# Patient Record
Sex: FEMALE | Race: WHITE | NOT HISPANIC OR LATINO | URBAN - METROPOLITAN AREA
[De-identification: names, ages, dates, MRNs, and addresses within clinical notes are randomized per-mention and may not be internally consistent; named-entity substitution may affect disease eponyms.]

---

## 2017-02-02 ENCOUNTER — OUTPATIENT (OUTPATIENT)
Dept: OUTPATIENT SERVICES | Facility: HOSPITAL | Age: 62
LOS: 1 days | Discharge: HOME | End: 2017-02-02

## 2017-06-27 DIAGNOSIS — R52 PAIN, UNSPECIFIED: ICD-10-CM

## 2022-12-02 ENCOUNTER — APPOINTMENT (OUTPATIENT)
Age: 67
End: 2022-12-02

## 2022-12-02 VITALS
BODY MASS INDEX: 31.28 KG/M2 | DIASTOLIC BLOOD PRESSURE: 60 MMHG | RESPIRATION RATE: 12 BRPM | SYSTOLIC BLOOD PRESSURE: 120 MMHG | HEART RATE: 85 BPM | HEIGHT: 62 IN | WEIGHT: 170 LBS | OXYGEN SATURATION: 97 %

## 2022-12-02 DIAGNOSIS — Z87.39 PERSONAL HISTORY OF OTHER DISEASES OF THE MUSCULOSKELETAL SYSTEM AND CONNECTIVE TISSUE: ICD-10-CM

## 2022-12-02 DIAGNOSIS — Z86.79 PERSONAL HISTORY OF OTHER DISEASES OF THE CIRCULATORY SYSTEM: ICD-10-CM

## 2022-12-02 PROCEDURE — 99203 OFFICE O/P NEW LOW 30 MIN: CPT

## 2022-12-02 NOTE — ASSESSMENT
[FreeTextEntry1] : Patient has blunting of the costophrenic angles which may be related to tiny pleural effusions.\par She is undergoing a cardiac work-up.\par She states that she has renal disease due to the PMR.  I do not think that she has any overt lung disease.\par The patient has been cautioned to watch her diet especially as it relates to salt consumption.\par We will see what the cardiologist has to say.  She may need intermittent doses of low-dose diuretics.\par She is going to follow-up with nephrology.\par

## 2022-12-02 NOTE — REASON FOR VISIT
[Initial] : an initial visit [TextBox_[id number]] : Patient had a URI about a month ago and was sent for a chest x-ray.  The chest x-ray was normal other than bilateral blunting of the costophrenic angle suggestive of a pleural effusion.  The patient is feeling absolutely fine.  However, she was nervous about the abnormal chest x-ray and presents for evaluation.  She has a history of PMR and hypertension.  She is followed by cardiology who is recently ordered an echo and a stress test.  She states that she occasionally gets swollen ankles over the course of the day that subside when she is sleeping at night.  She has dyspnea on exertion but she believes that this is due to not exercising.  She has never had any lung problems.

## 2023-06-02 ENCOUNTER — LABORATORY RESULT (OUTPATIENT)
Age: 68
End: 2023-06-02

## 2023-06-02 ENCOUNTER — APPOINTMENT (OUTPATIENT)
Dept: HEMATOLOGY ONCOLOGY | Facility: CLINIC | Age: 68
End: 2023-06-02
Payer: MEDICARE

## 2023-06-02 ENCOUNTER — OUTPATIENT (OUTPATIENT)
Dept: OUTPATIENT SERVICES | Facility: HOSPITAL | Age: 68
LOS: 1 days | End: 2023-06-02
Payer: MEDICARE

## 2023-06-02 VITALS
HEIGHT: 62 IN | WEIGHT: 152 LBS | RESPIRATION RATE: 16 BRPM | HEART RATE: 80 BPM | DIASTOLIC BLOOD PRESSURE: 64 MMHG | TEMPERATURE: 98 F | SYSTOLIC BLOOD PRESSURE: 135 MMHG | BODY MASS INDEX: 27.97 KG/M2

## 2023-06-02 DIAGNOSIS — D64.9 ANEMIA, UNSPECIFIED: ICD-10-CM

## 2023-06-02 LAB
HCT VFR BLD CALC: 25.2 %
HGB BLD-MCNC: 8.1 G/DL
MCHC RBC-ENTMCNC: 27.9 PG
MCHC RBC-ENTMCNC: 32.1 G/DL
MCV RBC AUTO: 86.9 FL
PLATELET # BLD AUTO: 225 K/UL
PMV BLD: 9.1 FL
RBC # BLD: 2.9 M/UL
RBC # FLD: 15.1 %
WBC # FLD AUTO: 4.79 K/UL

## 2023-06-02 PROCEDURE — 85046 RETICYTE/HGB CONCENTRATE: CPT

## 2023-06-02 PROCEDURE — 99205 OFFICE O/P NEW HI 60 MIN: CPT

## 2023-06-02 PROCEDURE — 84550 ASSAY OF BLOOD/URIC ACID: CPT

## 2023-06-02 PROCEDURE — 82728 ASSAY OF FERRITIN: CPT

## 2023-06-02 PROCEDURE — 83550 IRON BINDING TEST: CPT

## 2023-06-02 PROCEDURE — 83540 ASSAY OF IRON: CPT

## 2023-06-02 PROCEDURE — 83615 LACTATE (LD) (LDH) ENZYME: CPT

## 2023-06-02 PROCEDURE — 80076 HEPATIC FUNCTION PANEL: CPT

## 2023-06-02 PROCEDURE — 36415 COLL VENOUS BLD VENIPUNCTURE: CPT

## 2023-06-02 PROCEDURE — 80048 BASIC METABOLIC PNL TOTAL CA: CPT

## 2023-06-02 PROCEDURE — 84100 ASSAY OF PHOSPHORUS: CPT

## 2023-06-02 PROCEDURE — 83921 ORGANIC ACID SINGLE QUANT: CPT

## 2023-06-02 PROCEDURE — 85027 COMPLETE CBC AUTOMATED: CPT

## 2023-06-02 NOTE — HISTORY OF PRESENT ILLNESS
[de-identified] : Ms. AMELIA JOHNSON is a 67 year old female here today for evaluation and management of anemia.  \par \par AMELIA is a 67 year old F with PMHx including Polymyalgia rheumatica, pleural effusion, HTN, hyperlipidemia, who presents to clinic to establish care.   She states she has not had anemia prior to this occasion.  She was taking MTX and prednisone for PMR but stopped a few days ago.  She follows with [facility], DR. Claros.  She reports SOB with exertion.  Last GYN exam was in June 2022.  She reports unintentional weight loss over the last year as well as occasional night sweats.  Patient denies CP, palpitations, dizziness, dyspnea at rest, hematuria or overt bleeding.  Patient reports no known family history of malignancy or blood/clotting disorder.  Former smoker, quit in 1998.  \par \par LAB WORKUP\par (5.15.2023 - [facility]) WBC 4.7, Hgb 8.7, MCV 86.7, RDW 13.9, \par (5.10.2023 - [facility]) WBC 4.1, Hgb 8.6, MCV 87.7, RDW 13.9, \par (4.26.2023 - [facility]) WBC 6.9, Hgb 8.5, MCV 86.4, RDW 14, \par (4.22.2023 - [facility]) WBC 5.5, Hgb 8.8, MCV 88, RDW 14, , ironsat 11%, TIBC 228, ferritin 221, Vitamin B12 is 230, Folate >24, Cr 1.35, eGFR 43, normal LFTs\par \par HCM\par Colonoscopy done in 01/2023 with Dr. Meadows which was reportedly normal\par Upper Endoscopy done in 05/2023 with Dr. Meadows which was reportedly normal \par Mammography?\par GYN Pelvic Exam/pap overdue

## 2023-06-02 NOTE — REVIEW OF SYSTEMS
[Diarrhea: Grade 0] : Diarrhea: Grade 0 [Negative] : Allergic/Immunologic [Night Sweats] : night sweats [Fatigue] : fatigue [Recent Change In Weight] : ~T recent weight change

## 2023-06-02 NOTE — ASSESSMENT
[FreeTextEntry1] : # Anemia, normocytic ; vitamin B12 deficiency + iron deficiency \par - s/p Colonoscopy done in 01/2023 with Dr. Meadows which was reportedly normal ; requested patient to obtain results for our review \par - s/p Upper Endoscopy done in 05/2023 with Dr. Meadows which was reportedly normal ; requested patient to obtain results for our review \par - followup with GI as recommended to discuss role of VCE \par - followup with GYN as scheduled this month for complete pelvic examination \par - START IV Venofer 200mg x 5 , orders written + possible side effects discussed\par - s/p Vitamin B12 weekly x 4 , completed in 05/2023 \par - START Vitamin B12 1000mcg by mouth daily, Rx sent\par - START Folic Acid 1mg by mouth daily, Rx sent \par - Labwork today: CBC, BMP, LFTS, LDH, uric acid, phos, retic count, iron studies, ferritin, MMA level \par \par Labwork in 1 week: BMP \par RTC 5 weeks post final venofer with CBC, iron studies, ferritin 1-2 days prior \par \par seen/examined w/ NP Diane; note reviewed; case discussed\par the etiology of iron deficiency , b12 deficiency, fatigue, wt loss, and night sweats is not clear\par denies mucocutaneous bleeding\par not clear why she has renal insufficiency\par 1. increase water intake: BMP weekly: if renal fx improves CT CAP IVC to evaluate for lymphadenopathy/organomegaly; otherwise, CT CAP without IVC\par \par 2. stop oral iron and start venofer 200mg WEEKLY x5\par 3. start b12 and folate oral

## 2023-06-02 NOTE — CONSULT LETTER
[Dear  ___] : Dear  [unfilled], [Consult Letter:] : I had the pleasure of evaluating your patient, [unfilled]. [Please see my note below.] : Please see my note below. [Sincerely,] : Sincerely, [FreeTextEntry3] : Desean Madison DO\par Attending Physician,\par Hematology/ Medical Oncology\par [phone] office\par \par

## 2023-06-03 DIAGNOSIS — D64.9 ANEMIA, UNSPECIFIED: ICD-10-CM

## 2023-06-05 DIAGNOSIS — E53.8 DEFICIENCY OF OTHER SPECIFIED B GROUP VITAMINS: ICD-10-CM

## 2023-06-05 DIAGNOSIS — E61.1 IRON DEFICIENCY: ICD-10-CM

## 2023-06-05 LAB
ALBUMIN SERPL ELPH-MCNC: 3.1 G/DL
ALP BLD-CCNC: 64 U/L
ALT SERPL-CCNC: 10 U/L
ANION GAP SERPL CALC-SCNC: 11 MMOL/L
AST SERPL-CCNC: 20 U/L
BILIRUB DIRECT SERPL-MCNC: <0.2 MG/DL
BILIRUB INDIRECT SERPL-MCNC: >0.3 MG/DL
BILIRUB SERPL-MCNC: 0.5 MG/DL
BUN SERPL-MCNC: 23 MG/DL
CALCIUM SERPL-MCNC: 8.7 MG/DL
CHLORIDE SERPL-SCNC: 106 MMOL/L
CO2 SERPL-SCNC: 21 MMOL/L
CREAT SERPL-MCNC: 1.7 MG/DL
EGFR: 32 ML/MIN/1.73M2
FERRITIN SERPL-MCNC: 366 NG/ML
GLUCOSE SERPL-MCNC: 106 MG/DL
IRON SATN MFR SERPL: 12 %
IRON SERPL-MCNC: 23 UG/DL
LDH SERPL-CCNC: 281
PHOSPHATE SERPL-MCNC: 3.8 MG/DL
POTASSIUM SERPL-SCNC: 3.9 MMOL/L
PROT SERPL-MCNC: 6.6 G/DL
SODIUM SERPL-SCNC: 138 MMOL/L
TIBC SERPL-MCNC: 185 UG/DL
UIBC SERPL-MCNC: 162 UG/DL
URATE SERPL-MCNC: 8 MG/DL

## 2023-06-06 LAB — METHYLMALONATE SERPL-SCNC: 229 NMOL/L

## 2023-06-07 ENCOUNTER — APPOINTMENT (OUTPATIENT)
Dept: INFUSION THERAPY | Facility: CLINIC | Age: 68
End: 2023-06-07

## 2023-06-07 ENCOUNTER — OUTPATIENT (OUTPATIENT)
Dept: OUTPATIENT SERVICES | Facility: HOSPITAL | Age: 68
LOS: 1 days | End: 2023-06-07
Payer: MEDICARE

## 2023-06-07 DIAGNOSIS — E61.1 IRON DEFICIENCY: ICD-10-CM

## 2023-06-07 DIAGNOSIS — D64.9 ANEMIA, UNSPECIFIED: ICD-10-CM

## 2023-06-07 DIAGNOSIS — E53.8 DEFICIENCY OF OTHER SPECIFIED B GROUP VITAMINS: ICD-10-CM

## 2023-06-07 PROCEDURE — 96361 HYDRATE IV INFUSION ADD-ON: CPT

## 2023-06-07 PROCEDURE — 96360 HYDRATION IV INFUSION INIT: CPT

## 2023-06-07 RX ORDER — SODIUM CHLORIDE 9 MG/ML
1000 INJECTION INTRAMUSCULAR; INTRAVENOUS; SUBCUTANEOUS
Refills: 0 | Status: DISCONTINUED | OUTPATIENT
Start: 2023-06-07 | End: 2023-09-07

## 2023-06-08 ENCOUNTER — APPOINTMENT (OUTPATIENT)
Dept: HEMATOLOGY ONCOLOGY | Facility: CLINIC | Age: 68
End: 2023-06-08

## 2023-06-12 ENCOUNTER — APPOINTMENT (OUTPATIENT)
Dept: INFUSION THERAPY | Facility: CLINIC | Age: 68
End: 2023-06-12

## 2023-06-12 ENCOUNTER — OUTPATIENT (OUTPATIENT)
Dept: OUTPATIENT SERVICES | Facility: HOSPITAL | Age: 68
LOS: 1 days | End: 2023-06-12
Payer: MEDICARE

## 2023-06-12 DIAGNOSIS — E61.1 IRON DEFICIENCY: ICD-10-CM

## 2023-06-12 DIAGNOSIS — Z00.00 ENCOUNTER FOR GENERAL ADULT MEDICAL EXAMINATION W/OUT ABNORMAL FINDINGS: ICD-10-CM

## 2023-06-12 DIAGNOSIS — D64.9 ANEMIA, UNSPECIFIED: ICD-10-CM

## 2023-06-12 DIAGNOSIS — E53.8 DEFICIENCY OF OTHER SPECIFIED B GROUP VITAMINS: ICD-10-CM

## 2023-06-12 PROCEDURE — 36415 COLL VENOUS BLD VENIPUNCTURE: CPT

## 2023-06-12 PROCEDURE — 96365 THER/PROPH/DIAG IV INF INIT: CPT

## 2023-06-12 PROCEDURE — 80048 BASIC METABOLIC PNL TOTAL CA: CPT

## 2023-06-12 RX ORDER — IRON SUCROSE 20 MG/ML
200 INJECTION, SOLUTION INTRAVENOUS ONCE
Refills: 0 | Status: COMPLETED | OUTPATIENT
Start: 2023-06-12 | End: 2023-06-12

## 2023-06-12 RX ADMIN — IRON SUCROSE 220 MILLIGRAM(S): 20 INJECTION, SOLUTION INTRAVENOUS at 15:46

## 2023-06-12 RX ADMIN — IRON SUCROSE 200 MILLIGRAM(S): 20 INJECTION, SOLUTION INTRAVENOUS at 16:16

## 2023-06-13 LAB
ANION GAP SERPL CALC-SCNC: 11 MMOL/L
BUN SERPL-MCNC: 18 MG/DL
CALCIUM SERPL-MCNC: 8.3 MG/DL
CHLORIDE SERPL-SCNC: 104 MMOL/L
CO2 SERPL-SCNC: 22 MMOL/L
CREAT SERPL-MCNC: 1.7 MG/DL
EGFR: 32 ML/MIN/1.73M2
GLUCOSE SERPL-MCNC: 101 MG/DL
POTASSIUM SERPL-SCNC: 3.9 MMOL/L
SODIUM SERPL-SCNC: 137 MMOL/L

## 2023-06-19 ENCOUNTER — OUTPATIENT (OUTPATIENT)
Dept: OUTPATIENT SERVICES | Facility: HOSPITAL | Age: 68
LOS: 1 days | End: 2023-06-19
Payer: MEDICARE

## 2023-06-19 ENCOUNTER — APPOINTMENT (OUTPATIENT)
Dept: INFUSION THERAPY | Facility: CLINIC | Age: 68
End: 2023-06-19

## 2023-06-19 DIAGNOSIS — D64.9 ANEMIA, UNSPECIFIED: ICD-10-CM

## 2023-06-19 DIAGNOSIS — E53.8 DEFICIENCY OF OTHER SPECIFIED B GROUP VITAMINS: ICD-10-CM

## 2023-06-19 DIAGNOSIS — E61.1 IRON DEFICIENCY: ICD-10-CM

## 2023-06-19 PROCEDURE — 80048 BASIC METABOLIC PNL TOTAL CA: CPT

## 2023-06-19 PROCEDURE — 96365 THER/PROPH/DIAG IV INF INIT: CPT

## 2023-06-19 PROCEDURE — 36415 COLL VENOUS BLD VENIPUNCTURE: CPT

## 2023-06-19 RX ORDER — IRON SUCROSE 20 MG/ML
200 INJECTION, SOLUTION INTRAVENOUS ONCE
Refills: 0 | Status: COMPLETED | OUTPATIENT
Start: 2023-06-19 | End: 2023-06-19

## 2023-06-19 RX ADMIN — IRON SUCROSE 220 MILLIGRAM(S): 20 INJECTION, SOLUTION INTRAVENOUS at 15:51

## 2023-06-19 RX ADMIN — IRON SUCROSE 200 MILLIGRAM(S): 20 INJECTION, SOLUTION INTRAVENOUS at 16:20

## 2023-06-26 ENCOUNTER — APPOINTMENT (OUTPATIENT)
Dept: INFUSION THERAPY | Facility: CLINIC | Age: 68
End: 2023-06-26

## 2023-06-26 ENCOUNTER — LABORATORY RESULT (OUTPATIENT)
Age: 68
End: 2023-06-26

## 2023-06-26 ENCOUNTER — OUTPATIENT (OUTPATIENT)
Dept: OUTPATIENT SERVICES | Facility: HOSPITAL | Age: 68
LOS: 1 days | End: 2023-06-26
Payer: MEDICARE

## 2023-06-26 DIAGNOSIS — D64.9 ANEMIA, UNSPECIFIED: ICD-10-CM

## 2023-06-26 DIAGNOSIS — E61.1 IRON DEFICIENCY: ICD-10-CM

## 2023-06-26 DIAGNOSIS — E53.8 DEFICIENCY OF OTHER SPECIFIED B GROUP VITAMINS: ICD-10-CM

## 2023-06-26 PROCEDURE — 85027 COMPLETE CBC AUTOMATED: CPT

## 2023-06-26 PROCEDURE — 96365 THER/PROPH/DIAG IV INF INIT: CPT

## 2023-06-26 PROCEDURE — 36415 COLL VENOUS BLD VENIPUNCTURE: CPT

## 2023-06-26 RX ORDER — IRON SUCROSE 20 MG/ML
200 INJECTION, SOLUTION INTRAVENOUS ONCE
Refills: 0 | Status: COMPLETED | OUTPATIENT
Start: 2023-06-26 | End: 2023-06-26

## 2023-06-26 RX ADMIN — IRON SUCROSE 200 MILLIGRAM(S): 20 INJECTION, SOLUTION INTRAVENOUS at 15:50

## 2023-06-26 RX ADMIN — IRON SUCROSE 220 MILLIGRAM(S): 20 INJECTION, SOLUTION INTRAVENOUS at 15:17

## 2023-07-03 ENCOUNTER — APPOINTMENT (OUTPATIENT)
Dept: INFUSION THERAPY | Facility: CLINIC | Age: 68
End: 2023-07-03

## 2023-07-03 ENCOUNTER — OUTPATIENT (OUTPATIENT)
Dept: OUTPATIENT SERVICES | Facility: HOSPITAL | Age: 68
LOS: 1 days | End: 2023-07-03
Payer: MEDICARE

## 2023-07-03 ENCOUNTER — EMERGENCY (EMERGENCY)
Facility: HOSPITAL | Age: 68
LOS: 0 days | Discharge: HOME | End: 2023-07-04
Attending: EMERGENCY MEDICINE
Payer: MEDICARE

## 2023-07-03 ENCOUNTER — LABORATORY RESULT (OUTPATIENT)
Age: 68
End: 2023-07-03

## 2023-07-03 VITALS
SYSTOLIC BLOOD PRESSURE: 182 MMHG | RESPIRATION RATE: 18 BRPM | OXYGEN SATURATION: 100 % | TEMPERATURE: 98 F | DIASTOLIC BLOOD PRESSURE: 80 MMHG | WEIGHT: 149.91 LBS | HEART RATE: 80 BPM

## 2023-07-03 VITALS
RESPIRATION RATE: 17 BRPM | HEART RATE: 84 BPM | OXYGEN SATURATION: 100 % | SYSTOLIC BLOOD PRESSURE: 165 MMHG | TEMPERATURE: 98 F | DIASTOLIC BLOOD PRESSURE: 72 MMHG

## 2023-07-03 DIAGNOSIS — E87.6 HYPOKALEMIA: ICD-10-CM

## 2023-07-03 DIAGNOSIS — I10 ESSENTIAL (PRIMARY) HYPERTENSION: ICD-10-CM

## 2023-07-03 DIAGNOSIS — Z88.5 ALLERGY STATUS TO NARCOTIC AGENT: ICD-10-CM

## 2023-07-03 DIAGNOSIS — R06.02 SHORTNESS OF BREATH: ICD-10-CM

## 2023-07-03 DIAGNOSIS — D64.9 ANEMIA, UNSPECIFIED: ICD-10-CM

## 2023-07-03 DIAGNOSIS — Z87.891 PERSONAL HISTORY OF NICOTINE DEPENDENCE: ICD-10-CM

## 2023-07-03 DIAGNOSIS — E78.5 HYPERLIPIDEMIA, UNSPECIFIED: ICD-10-CM

## 2023-07-03 LAB
ABO RH CONFIRMATION: SIGNIFICANT CHANGE UP
ALBUMIN SERPL ELPH-MCNC: 2.4 G/DL — LOW (ref 3.5–5.2)
ALP SERPL-CCNC: 86 U/L — SIGNIFICANT CHANGE UP (ref 30–115)
ALT FLD-CCNC: 10 U/L — SIGNIFICANT CHANGE UP (ref 0–41)
ANION GAP SERPL CALC-SCNC: 12 MMOL/L — SIGNIFICANT CHANGE UP (ref 7–14)
ANION GAP SERPL CALC-SCNC: 9 MMOL/L
APTT BLD: 32.7 SEC — SIGNIFICANT CHANGE UP (ref 27–39.2)
AST SERPL-CCNC: 17 U/L — SIGNIFICANT CHANGE UP (ref 0–41)
BASOPHILS # BLD AUTO: 0.02 K/UL — SIGNIFICANT CHANGE UP (ref 0–0.2)
BASOPHILS NFR BLD AUTO: 0.3 % — SIGNIFICANT CHANGE UP (ref 0–1)
BILIRUB SERPL-MCNC: 0.4 MG/DL — SIGNIFICANT CHANGE UP (ref 0.2–1.2)
BLD GP AB SCN SERPL QL: SIGNIFICANT CHANGE UP
BUN SERPL-MCNC: 19 MG/DL
BUN SERPL-MCNC: 9 MG/DL — LOW (ref 10–20)
CALCIUM SERPL-MCNC: 7.9 MG/DL — LOW (ref 8.4–10.5)
CALCIUM SERPL-MCNC: 8.2 MG/DL
CHLORIDE SERPL-SCNC: 101 MMOL/L — SIGNIFICANT CHANGE UP (ref 98–110)
CHLORIDE SERPL-SCNC: 104 MMOL/L
CO2 SERPL-SCNC: 21 MMOL/L — SIGNIFICANT CHANGE UP (ref 17–32)
CO2 SERPL-SCNC: 23 MMOL/L
CREAT SERPL-MCNC: 0.9 MG/DL — SIGNIFICANT CHANGE UP (ref 0.7–1.5)
CREAT SERPL-MCNC: 1.5 MG/DL
EGFR: 37 ML/MIN/1.73M2
EGFR: 69 ML/MIN/1.73M2 — SIGNIFICANT CHANGE UP
EOSINOPHIL # BLD AUTO: 0.06 K/UL — SIGNIFICANT CHANGE UP (ref 0–0.7)
EOSINOPHIL NFR BLD AUTO: 0.8 % — SIGNIFICANT CHANGE UP (ref 0–8)
GLUCOSE SERPL-MCNC: 91 MG/DL
GLUCOSE SERPL-MCNC: 93 MG/DL — SIGNIFICANT CHANGE UP (ref 70–99)
HCT VFR BLD CALC: 25.3 %
HCT VFR BLD CALC: 26.4 % — LOW (ref 37–47)
HGB BLD-MCNC: 8 G/DL
HGB BLD-MCNC: 8.4 G/DL — LOW (ref 12–16)
IMM GRANULOCYTES NFR BLD AUTO: 0.6 % — HIGH (ref 0.1–0.3)
INR BLD: 1.31 RATIO — HIGH (ref 0.65–1.3)
LYMPHOCYTES # BLD AUTO: 1.18 K/UL — LOW (ref 1.2–3.4)
LYMPHOCYTES # BLD AUTO: 16.7 % — LOW (ref 20.5–51.1)
MCHC RBC-ENTMCNC: 26.1 PG
MCHC RBC-ENTMCNC: 26.6 PG — LOW (ref 27–31)
MCHC RBC-ENTMCNC: 31.6 G/DL
MCHC RBC-ENTMCNC: 31.8 G/DL — LOW (ref 32–37)
MCV RBC AUTO: 82.7 FL
MCV RBC AUTO: 83.5 FL — SIGNIFICANT CHANGE UP (ref 81–99)
MONOCYTES # BLD AUTO: 0.23 K/UL — SIGNIFICANT CHANGE UP (ref 0.1–0.6)
MONOCYTES NFR BLD AUTO: 3.3 % — SIGNIFICANT CHANGE UP (ref 1.7–9.3)
NEUTROPHILS # BLD AUTO: 5.54 K/UL — SIGNIFICANT CHANGE UP (ref 1.4–6.5)
NEUTROPHILS NFR BLD AUTO: 78.3 % — HIGH (ref 42.2–75.2)
NRBC # BLD: 0 /100 WBCS — SIGNIFICANT CHANGE UP (ref 0–0)
PLATELET # BLD AUTO: 271 K/UL
PLATELET # BLD AUTO: 298 K/UL — SIGNIFICANT CHANGE UP (ref 130–400)
PMV BLD: 9.2 FL
PMV BLD: 9.5 FL — SIGNIFICANT CHANGE UP (ref 7.4–10.4)
POTASSIUM SERPL-MCNC: 3.3 MMOL/L — LOW (ref 3.5–5)
POTASSIUM SERPL-SCNC: 3.3 MMOL/L — LOW (ref 3.5–5)
POTASSIUM SERPL-SCNC: 4 MMOL/L
PROT SERPL-MCNC: 5.9 G/DL — LOW (ref 6–8)
PROTHROM AB SERPL-ACNC: 15.1 SEC — HIGH (ref 9.95–12.87)
RBC # BLD: 3.06 M/UL
RBC # BLD: 3.16 M/UL — LOW (ref 4.2–5.4)
RBC # FLD: 14.5 %
RBC # FLD: 14.6 % — HIGH (ref 11.5–14.5)
SODIUM SERPL-SCNC: 134 MMOL/L — LOW (ref 135–146)
SODIUM SERPL-SCNC: 136 MMOL/L
TROPONIN T SERPL-MCNC: <0.01 NG/ML — SIGNIFICANT CHANGE UP
WBC # BLD: 7.07 K/UL — SIGNIFICANT CHANGE UP (ref 4.8–10.8)
WBC # FLD AUTO: 6.5 K/UL
WBC # FLD AUTO: 7.07 K/UL — SIGNIFICANT CHANGE UP (ref 4.8–10.8)

## 2023-07-03 PROCEDURE — 96365 THER/PROPH/DIAG IV INF INIT: CPT

## 2023-07-03 PROCEDURE — 36430 TRANSFUSION BLD/BLD COMPNT: CPT

## 2023-07-03 PROCEDURE — 99285 EMERGENCY DEPT VISIT HI MDM: CPT | Mod: 25

## 2023-07-03 PROCEDURE — 86850 RBC ANTIBODY SCREEN: CPT

## 2023-07-03 PROCEDURE — 36415 COLL VENOUS BLD VENIPUNCTURE: CPT

## 2023-07-03 PROCEDURE — 85025 COMPLETE CBC W/AUTO DIFF WBC: CPT

## 2023-07-03 PROCEDURE — 85610 PROTHROMBIN TIME: CPT

## 2023-07-03 PROCEDURE — 71045 X-RAY EXAM CHEST 1 VIEW: CPT

## 2023-07-03 PROCEDURE — 85027 COMPLETE CBC AUTOMATED: CPT

## 2023-07-03 PROCEDURE — 86901 BLOOD TYPING SEROLOGIC RH(D): CPT

## 2023-07-03 PROCEDURE — 71045 X-RAY EXAM CHEST 1 VIEW: CPT | Mod: 26

## 2023-07-03 PROCEDURE — 86923 COMPATIBILITY TEST ELECTRIC: CPT

## 2023-07-03 PROCEDURE — 93005 ELECTROCARDIOGRAM TRACING: CPT

## 2023-07-03 PROCEDURE — 84484 ASSAY OF TROPONIN QUANT: CPT

## 2023-07-03 PROCEDURE — P9016: CPT

## 2023-07-03 PROCEDURE — 99285 EMERGENCY DEPT VISIT HI MDM: CPT | Mod: FS

## 2023-07-03 PROCEDURE — 93010 ELECTROCARDIOGRAM REPORT: CPT

## 2023-07-03 PROCEDURE — 85730 THROMBOPLASTIN TIME PARTIAL: CPT

## 2023-07-03 PROCEDURE — 80053 COMPREHEN METABOLIC PANEL: CPT

## 2023-07-03 PROCEDURE — 86900 BLOOD TYPING SEROLOGIC ABO: CPT

## 2023-07-03 RX ORDER — POTASSIUM CHLORIDE 20 MEQ
20 PACKET (EA) ORAL ONCE
Refills: 0 | Status: COMPLETED | OUTPATIENT
Start: 2023-07-03 | End: 2023-07-03

## 2023-07-03 RX ORDER — IRON SUCROSE 20 MG/ML
200 INJECTION, SOLUTION INTRAVENOUS ONCE
Refills: 0 | Status: COMPLETED | OUTPATIENT
Start: 2023-07-03 | End: 2023-07-03

## 2023-07-03 RX ADMIN — IRON SUCROSE 220 MILLIGRAM(S): 20 INJECTION, SOLUTION INTRAVENOUS at 15:30

## 2023-07-03 RX ADMIN — Medication 20 MILLIEQUIVALENT(S): at 20:48

## 2023-07-03 RX ADMIN — IRON SUCROSE 200 MILLIGRAM(S): 20 INJECTION, SOLUTION INTRAVENOUS at 16:01

## 2023-07-03 NOTE — ED PROVIDER NOTE - OBJECTIVE STATEMENT
66 yo female with a pmh of anemia, htn, hld sent in for low hgb. pt was at the heme clinic for a iron infusion and was sent into ED due to her hgb being 7.7. pt states to have experienced SOB with exertion for 2 weeks. pt has received 3 iron transfusion over the past couple of months. pt denies any other symptoms including fevers, chill, headache, recent illness/travel, cough, abdominal pain, chest pain.

## 2023-07-03 NOTE — ED PROVIDER NOTE - PROGRESS NOTE DETAILS
PA spoke to pt. Had pt walk around. No longer SOB while exerting. Transfusion tolerated well. Requesting to go home

## 2023-07-03 NOTE — ED PROVIDER NOTE - CLINICAL SUMMARY MEDICAL DECISION MAKING FREE TEXT BOX
67-year-old female with a past medical history of iron deficiency anemia, extensive work-up negative for bleeding, presents with shortness of breath, generalized fatigue for the past several weeks slowly getting worse.  Had outpatient blood work done recently and hemoglobin 7.7.  States that baseline is around 11 and it was last at that 6 months ago. Endorsed to me by Dr Nino (), to follow up transfusion of pt and reassess. Pt given 1 unit PRBC, and also KCL to replace slightly low serum level. Pt reassessed and feels better, able to walk around and no longer feeling SOB. Requesting to go home. To dc with return precautions. To follow up with her Hematologist in office in next couple of days.

## 2023-07-03 NOTE — ED PROVIDER NOTE - PATIENT PORTAL LINK FT
You can access the FollowMyHealth Patient Portal offered by [facility] by registering at the following website: [email or web address]. By joining [facility]’s FollowMyHealth portal, you will also be able to view your health information using other applications (apps) compatible with our system.

## 2023-07-03 NOTE — ED PROVIDER NOTE - ATTENDING APP SHARED VISIT CONTRIBUTION OF CARE
67-year-old female with a past medical history of iron deficiency anemia, extensive work-up negative for bleeding, presents with shortness of breath, generalized fatigue for the past several weeks slowly getting worse.  Had outpatient blood work done recently and hemoglobin 7.7.  States that baseline is around 11 and it was last at that 6 months ago.  No history of blood transfusion.  Has been getting iron transfusions weekly.  No abdominal pain or chest pain.  On exam nontoxic, vital signs noted, heart regular no murmurs, lungs clear bilaterally, abdomen benign.  Differential diagnosis includes symptomatic anemia from iron deficiency anemia, doubt GI bleed with extensive outpatient work-up, clinically not significantly concerned for ACS.  Chest x-ray not concerning for pneumonia or pulmonary edema.  Plan for blood transfusion, reassess and if feels well anticipate discharge with outpatient follow-up.  EKG with no ST depression or elevation and troponin negative.

## 2023-07-03 NOTE — ED PROVIDER NOTE - EKG ADDITIONAL INFORMATION FREE TEXT
Normal sinus rhythm, normal axis, first-degree AV block, normal intervals otherwise, no ST depression or elevation

## 2023-07-03 NOTE — ED PROVIDER NOTE - CARE PROVIDER_API CALL
Desean Madison  Medical Oncology  [address] [place] [id number]  Phone: [phone]  Fax: [phone]  Follow Up Time: 1-3 Days

## 2023-07-04 DIAGNOSIS — D64.9 ANEMIA, UNSPECIFIED: ICD-10-CM

## 2023-07-04 NOTE — ED ADULT NURSE REASSESSMENT NOTE - NSFALLUNIVINTERV_ED_ALL_ED
Bed/Stretcher in lowest position, wheels locked, appropriate side rails in place/Call bell, personal items and telephone in reach/Instruct patient to call for assistance before getting out of bed/chair/stretcher/Non-slip footwear applied when patient is off stretcher/[place] to call system/Physically safe environment - no spills, clutter or unnecessary equipment/Purposeful proactive rounding/Room/bathroom lighting operational, light cord in reach
Bed/Stretcher in lowest position, wheels locked, appropriate side rails in place/Call bell, personal items and telephone in reach/Instruct patient to call for assistance before getting out of bed/chair/stretcher/Non-slip footwear applied when patient is off stretcher/[place] to call system/Physically safe environment - no spills, clutter or unnecessary equipment/Purposeful proactive rounding/Room/bathroom lighting operational, light cord in reach

## 2023-07-04 NOTE — ED ADULT NURSE REASSESSMENT NOTE - NS ED NURSE REASSESS COMMENT FT1
pt s/p 1U PRBC transfusion. Pt w/o s/s of transfusion rx. Pt verbalized "feeling better". Pt with bedside cardiac monitor in place. VSS.
Pt A&Ox4. Presented to ED for weakness. Pt Hb 8.4 however, pt asymptomatic. Pt with LAC 18G in place. Awaiting lab results of confirmatory type and screen. Transfusion consent in pt chart. Pt able to make needs known. Pt placed on monitor. Call bell and bed alarm in place.

## 2023-07-05 LAB
HCT VFR BLD CALC: 23.9 %
HGB BLD-MCNC: 7.7 G/DL
MCHC RBC-ENTMCNC: 26.6 PG
MCHC RBC-ENTMCNC: 32.2 G/DL
MCV RBC AUTO: 82.4 FL
PLATELET # BLD AUTO: 281 K/UL
PMV BLD: NORMAL
RBC # BLD: 2.9 M/UL
RBC # FLD: 14.6 %
WBC # FLD AUTO: 7.34 K/UL

## 2023-07-10 ENCOUNTER — APPOINTMENT (OUTPATIENT)
Dept: INFUSION THERAPY | Facility: CLINIC | Age: 68
End: 2023-07-10

## 2023-07-10 ENCOUNTER — OUTPATIENT (OUTPATIENT)
Dept: OUTPATIENT SERVICES | Facility: HOSPITAL | Age: 68
LOS: 1 days | End: 2023-07-10
Payer: MEDICARE

## 2023-07-10 DIAGNOSIS — D64.9 ANEMIA, UNSPECIFIED: ICD-10-CM

## 2023-07-10 PROCEDURE — 96365 THER/PROPH/DIAG IV INF INIT: CPT

## 2023-07-10 RX ORDER — IRON SUCROSE 20 MG/ML
200 INJECTION, SOLUTION INTRAVENOUS ONCE
Refills: 0 | Status: COMPLETED | OUTPATIENT
Start: 2023-07-10 | End: 2023-07-10

## 2023-07-10 RX ADMIN — IRON SUCROSE 220 MILLIGRAM(S): 20 INJECTION, SOLUTION INTRAVENOUS at 16:10

## 2023-07-10 RX ADMIN — IRON SUCROSE 200 MILLIGRAM(S): 20 INJECTION, SOLUTION INTRAVENOUS at 16:40

## 2023-07-18 ENCOUNTER — APPOINTMENT (OUTPATIENT)
Dept: HEMATOLOGY ONCOLOGY | Facility: CLINIC | Age: 68
End: 2023-07-18
Payer: MEDICARE

## 2023-07-18 ENCOUNTER — LABORATORY RESULT (OUTPATIENT)
Age: 68
End: 2023-07-18

## 2023-07-18 ENCOUNTER — OUTPATIENT (OUTPATIENT)
Dept: OUTPATIENT SERVICES | Facility: HOSPITAL | Age: 68
LOS: 1 days | End: 2023-07-18
Payer: MEDICARE

## 2023-07-18 VITALS
DIASTOLIC BLOOD PRESSURE: 81 MMHG | TEMPERATURE: 98 F | HEIGHT: 62 IN | WEIGHT: 139 LBS | SYSTOLIC BLOOD PRESSURE: 196 MMHG | HEART RATE: 76 BPM | BODY MASS INDEX: 25.58 KG/M2 | RESPIRATION RATE: 16 BRPM

## 2023-07-18 DIAGNOSIS — D61.818 OTHER PANCYTOPENIA: ICD-10-CM

## 2023-07-18 LAB
HCT VFR BLD CALC: 31.4 %
HGB BLD-MCNC: 10.1 G/DL
MCHC RBC-ENTMCNC: 26.2 PG
MCHC RBC-ENTMCNC: 32.2 G/DL
MCV RBC AUTO: 81.6 FL
PLATELET # BLD AUTO: 222 K/UL
PMV BLD: 10.4 FL
RBC # BLD: 3.85 M/UL
RBC # FLD: 15.3 %
WBC # FLD AUTO: 8.66 K/UL

## 2023-07-18 PROCEDURE — 99214 OFFICE O/P EST MOD 30 MIN: CPT

## 2023-07-18 PROCEDURE — 80048 BASIC METABOLIC PNL TOTAL CA: CPT

## 2023-07-18 PROCEDURE — 80076 HEPATIC FUNCTION PANEL: CPT

## 2023-07-18 PROCEDURE — 84443 ASSAY THYROID STIM HORMONE: CPT

## 2023-07-18 PROCEDURE — 84439 ASSAY OF FREE THYROXINE: CPT

## 2023-07-18 PROCEDURE — 81001 URINALYSIS AUTO W/SCOPE: CPT

## 2023-07-18 PROCEDURE — 84480 ASSAY TRIIODOTHYRONINE (T3): CPT

## 2023-07-18 PROCEDURE — 85027 COMPLETE CBC AUTOMATED: CPT

## 2023-07-18 NOTE — REVIEW OF SYSTEMS
[Night Sweats] : night sweats [Fatigue] : fatigue [Recent Change In Weight] : ~T recent weight change [Diarrhea: Grade 0] : Diarrhea: Grade 0 [Negative] : Allergic/Immunologic [FreeTextEntry2] : 30lb weight loss in 6 months, poor appetite

## 2023-07-18 NOTE — ASSESSMENT
[FreeTextEntry1] : # Anemia, normocytic ; vitamin B12 deficiency + iron deficiency \par - s/p Colonoscopy done in 01/2023 with Dr. Meadows which was reportedly normal ; requested patient to obtain results for our review \par - s/p Upper Endoscopy done in 05/2023 with Dr. Meadows which was reportedly normal ; requested patient to obtain results for our review \par - followup with GI as recommended to discuss role of VCE \par - followup with GYN as scheduled this month for complete pelvic examination \par - c/w Vitamin B12 1000mcg by mouth daily \par - c/w Folic Acid 1mg by mouth daily \par - s/p IV Venofer 200mg x 5 , completed in 07/2023 \par - Labwork today: CBC, BMP, LFTs, TSH, FT4, T3, UA \par - CT C/A/P with IVC ordered to evaluate adenopathy or liver/spleen abnormality, Rx given \par \par Requested to obtain copy of CT without IVC performed at [facility] during ER visit.  Explained this is unlikely to be useful since lack of contrast may obscure lymph findings.   \par \par RTC 1 month with CBC, iron studies, ferritin 2 days prior \par \par seen/ examined w/NP C.Smart; note reviewed; case dscussed; agree w/ above\par 45 lbs wt loss, unintentional, poor appetite, unexplained iron deficiency: aware of CT scan without IVC at [facility]; will get results; still recommend the study with IVC to r/o adenopathy/ lymphoma/ organomegaly

## 2023-07-18 NOTE — HISTORY OF PRESENT ILLNESS
[de-identified] : Ms. AMELIA JOHNSON is a 67 year old female here today for evaluation and management of anemia.  \par \par AMELIA is a 67 year old F with PMHx including Polymyalgia rheumatica, pleural effusion, HTN, hyperlipidemia, who presents to clinic to establish care.   She states she has not had anemia prior to this occasion.  She was taking MTX and prednisone for PMR but stopped a few days ago.  She follows with [facility], DR. Claros.  She reports SOB with exertion.  Last GYN exam was in June 2022.  She reports unintentional weight loss over the last year as well as occasional night sweats.  Patient denies CP, palpitations, dizziness, dyspnea at rest, hematuria or overt bleeding.  Patient reports no known family history of malignancy or blood/clotting disorder.  Former smoker, quit in 1998.  \par \par LAB WORKUP\par (5.15.2023 - [facility]) WBC 4.7, Hgb 8.7, MCV 86.7, RDW 13.9, \par (5.10.2023 - [facility]) WBC 4.1, Hgb 8.6, MCV 87.7, RDW 13.9, \par (4.26.2023 - [facility]) WBC 6.9, Hgb 8.5, MCV 86.4, RDW 14, \par (4.22.2023 - [facility]) WBC 5.5, Hgb 8.8, MCV 88, RDW 14, , ironsat 11%, TIBC 228, ferritin 221, Vitamin B12 is 230, Folate >24, Cr 1.35, eGFR 43, normal LFTs\par \par HCM\par Colonoscopy done in 01/2023 with Dr. Meadows which was reportedly normal\par Upper Endoscopy done in 05/2023 with Dr. Meadows which was reportedly normal \par Mammography?\par GYN Pelvic Exam/pap overdue  [de-identified] : 7/18/23\par Patient is here for a follow-up visit for anemia.  She is feeling well but endorses fatigue.  She completed IV venofer in 07/2023.  Last CBC showed hgb down to 7.7g/dL so she was sent to ER and received 1 unit PRBC.  Patient states she developed RUQ discomfort last week and went to [facility] where they performed CT imaging and further workup including urine testing which showed blood.  She is now on oral abx (Cefuroxime) for suspected UTI.  Patient continues to take oral Vitamin B12 and folic acid once daily.  Reviewed most recent CBC, which shows hgb up to 10.1g/dL.  Patient denies CP, palpitations, dizziness, dyspnea, hematuria or PRBRB.

## 2023-07-18 NOTE — CONSULT LETTER
[Dear  ___] : Dear  [unfilled], [Consult Letter:] : I had the pleasure of evaluating your patient, [unfilled]. [Please see my note below.] : Please see my note below. [Sincerely,] : Sincerely, [FreeTextEntry3] : Desean Madison DO\par Attending Physician,\par Hematology/ Medical Oncology\par [phone] office\par \par

## 2023-07-19 ENCOUNTER — TRANSCRIPTION ENCOUNTER (OUTPATIENT)
Age: 68
End: 2023-07-19

## 2023-07-19 DIAGNOSIS — D61.818 OTHER PANCYTOPENIA: ICD-10-CM

## 2023-07-19 DIAGNOSIS — E53.8 DEFICIENCY OF OTHER SPECIFIED B GROUP VITAMINS: ICD-10-CM

## 2023-07-19 DIAGNOSIS — R63.4 ABNORMAL WEIGHT LOSS: ICD-10-CM

## 2023-07-19 DIAGNOSIS — D64.9 ANEMIA, UNSPECIFIED: ICD-10-CM

## 2023-07-19 DIAGNOSIS — E61.1 IRON DEFICIENCY: ICD-10-CM

## 2023-07-19 LAB
ALBUMIN SERPL ELPH-MCNC: 2.8 G/DL
ALP BLD-CCNC: 96 U/L
ALT SERPL-CCNC: 13 U/L
ANION GAP SERPL CALC-SCNC: 8 MMOL/L
APPEARANCE: CLEAR
AST SERPL-CCNC: 21 U/L
BILIRUB DIRECT SERPL-MCNC: 0.2 MG/DL
BILIRUB INDIRECT SERPL-MCNC: 0.2 MG/DL
BILIRUB SERPL-MCNC: 0.4 MG/DL
BILIRUBIN URINE: NEGATIVE
BLOOD URINE: ABNORMAL
BUN SERPL-MCNC: 7 MG/DL
CALCIUM SERPL-MCNC: 8.8 MG/DL
CHLORIDE SERPL-SCNC: 100 MMOL/L
CO2 SERPL-SCNC: 26 MMOL/L
COLOR: YELLOW
CREAT SERPL-MCNC: 0.7 MG/DL
EGFR: 94 ML/MIN/1.73M2
GLUCOSE QUALITATIVE U: NEGATIVE
GLUCOSE SERPL-MCNC: 96 MG/DL
KETONES URINE: NORMAL
LEUKOCYTE ESTERASE URINE: ABNORMAL
NITRITE URINE: NEGATIVE
PH URINE: 6.5
POTASSIUM SERPL-SCNC: 3.6 MMOL/L
PROT SERPL-MCNC: 6.4 G/DL
PROTEIN URINE: ABNORMAL
SODIUM SERPL-SCNC: 134 MMOL/L
SPECIFIC GRAVITY URINE: 1.02
T3 SERPL-MCNC: 91 NG/DL
T4 FREE SERPL-MCNC: 1.3 NG/DL
TSH SERPL-ACNC: 2.91 UIU/ML
UROBILINOGEN URINE: NORMAL

## 2023-07-24 ENCOUNTER — INPATIENT (INPATIENT)
Facility: HOSPITAL | Age: 68
LOS: 6 days | Discharge: HOME | DRG: 545 | End: 2023-07-31
Attending: HOSPITALIST | Admitting: STUDENT IN AN ORGANIZED HEALTH CARE EDUCATION/TRAINING PROGRAM
Payer: MEDICARE

## 2023-07-24 VITALS
TEMPERATURE: 98 F | HEART RATE: 85 BPM | OXYGEN SATURATION: 97 % | DIASTOLIC BLOOD PRESSURE: 91 MMHG | RESPIRATION RATE: 18 BRPM | SYSTOLIC BLOOD PRESSURE: 180 MMHG | WEIGHT: 138.01 LBS

## 2023-07-24 DIAGNOSIS — J90 PLEURAL EFFUSION, NOT ELSEWHERE CLASSIFIED: ICD-10-CM

## 2023-07-24 LAB
ALBUMIN SERPL ELPH-MCNC: 2.6 G/DL — LOW (ref 3.5–5.2)
ALP SERPL-CCNC: 94 U/L — SIGNIFICANT CHANGE UP (ref 30–115)
ALT FLD-CCNC: 8 U/L — SIGNIFICANT CHANGE UP (ref 0–41)
ANION GAP SERPL CALC-SCNC: 11 MMOL/L — SIGNIFICANT CHANGE UP (ref 7–14)
AST SERPL-CCNC: 16 U/L — SIGNIFICANT CHANGE UP (ref 0–41)
BASOPHILS # BLD AUTO: 0.01 K/UL — SIGNIFICANT CHANGE UP (ref 0–0.2)
BASOPHILS NFR BLD AUTO: 0.2 % — SIGNIFICANT CHANGE UP (ref 0–1)
BILIRUB SERPL-MCNC: 0.7 MG/DL — SIGNIFICANT CHANGE UP (ref 0.2–1.2)
BLD GP AB SCN SERPL QL: SIGNIFICANT CHANGE UP
BUN SERPL-MCNC: 8 MG/DL — LOW (ref 10–20)
CALCIUM SERPL-MCNC: 8.2 MG/DL — LOW (ref 8.4–10.5)
CHLORIDE SERPL-SCNC: 101 MMOL/L — SIGNIFICANT CHANGE UP (ref 98–110)
CO2 SERPL-SCNC: 25 MMOL/L — SIGNIFICANT CHANGE UP (ref 17–32)
CREAT SERPL-MCNC: 0.7 MG/DL — SIGNIFICANT CHANGE UP (ref 0.7–1.5)
D DIMER BLD IA.RAPID-MCNC: 5493 NG/ML DDU — HIGH
EGFR: 94 ML/MIN/1.73M2 — SIGNIFICANT CHANGE UP
EOSINOPHIL # BLD AUTO: 0.13 K/UL — SIGNIFICANT CHANGE UP (ref 0–0.7)
EOSINOPHIL NFR BLD AUTO: 2.8 % — SIGNIFICANT CHANGE UP (ref 0–8)
GLUCOSE SERPL-MCNC: 90 MG/DL — SIGNIFICANT CHANGE UP (ref 70–99)
HCT VFR BLD CALC: 31.5 % — LOW (ref 37–47)
HGB BLD-MCNC: 10 G/DL — LOW (ref 12–16)
IMM GRANULOCYTES NFR BLD AUTO: 0.4 % — HIGH (ref 0.1–0.3)
LIDOCAIN IGE QN: 10 U/L — SIGNIFICANT CHANGE UP (ref 7–60)
LYMPHOCYTES # BLD AUTO: 0.77 K/UL — LOW (ref 1.2–3.4)
LYMPHOCYTES # BLD AUTO: 16.7 % — LOW (ref 20.5–51.1)
MAGNESIUM SERPL-MCNC: 1.7 MG/DL — LOW (ref 1.8–2.4)
MCHC RBC-ENTMCNC: 26.2 PG — LOW (ref 27–31)
MCHC RBC-ENTMCNC: 31.7 G/DL — LOW (ref 32–37)
MCV RBC AUTO: 82.7 FL — SIGNIFICANT CHANGE UP (ref 81–99)
MONOCYTES # BLD AUTO: 0.17 K/UL — SIGNIFICANT CHANGE UP (ref 0.1–0.6)
MONOCYTES NFR BLD AUTO: 3.7 % — SIGNIFICANT CHANGE UP (ref 1.7–9.3)
NEUTROPHILS # BLD AUTO: 3.52 K/UL — SIGNIFICANT CHANGE UP (ref 1.4–6.5)
NEUTROPHILS NFR BLD AUTO: 76.2 % — HIGH (ref 42.2–75.2)
NRBC # BLD: 0 /100 WBCS — SIGNIFICANT CHANGE UP (ref 0–0)
NT-PROBNP SERPL-SCNC: 1328 PG/ML — HIGH (ref 0–300)
PLATELET # BLD AUTO: 299 K/UL — SIGNIFICANT CHANGE UP (ref 130–400)
PMV BLD: 9.4 FL — SIGNIFICANT CHANGE UP (ref 7.4–10.4)
POTASSIUM SERPL-MCNC: 3.6 MMOL/L — SIGNIFICANT CHANGE UP (ref 3.5–5)
POTASSIUM SERPL-SCNC: 3.6 MMOL/L — SIGNIFICANT CHANGE UP (ref 3.5–5)
PROT SERPL-MCNC: 5.8 G/DL — LOW (ref 6–8)
RBC # BLD: 3.81 M/UL — LOW (ref 4.2–5.4)
RBC # FLD: 16 % — HIGH (ref 11.5–14.5)
SODIUM SERPL-SCNC: 137 MMOL/L — SIGNIFICANT CHANGE UP (ref 135–146)
TROPONIN T SERPL-MCNC: <0.01 NG/ML — SIGNIFICANT CHANGE UP
WBC # BLD: 4.62 K/UL — LOW (ref 4.8–10.8)
WBC # FLD AUTO: 4.62 K/UL — LOW (ref 4.8–10.8)

## 2023-07-24 PROCEDURE — 86038 ANTINUCLEAR ANTIBODIES: CPT

## 2023-07-24 PROCEDURE — 71275 CT ANGIOGRAPHY CHEST: CPT | Mod: 26,ME

## 2023-07-24 PROCEDURE — 36415 COLL VENOUS BLD VENIPUNCTURE: CPT

## 2023-07-24 PROCEDURE — 99285 EMERGENCY DEPT VISIT HI MDM: CPT

## 2023-07-24 PROCEDURE — 71046 X-RAY EXAM CHEST 2 VIEWS: CPT | Mod: 26

## 2023-07-24 PROCEDURE — 87389 HIV-1 AG W/HIV-1&-2 AB AG IA: CPT

## 2023-07-24 PROCEDURE — 83550 IRON BINDING TEST: CPT

## 2023-07-24 PROCEDURE — 80048 BASIC METABOLIC PNL TOTAL CA: CPT

## 2023-07-24 PROCEDURE — 86225 DNA ANTIBODY NATIVE: CPT

## 2023-07-24 PROCEDURE — 93010 ELECTROCARDIOGRAM REPORT: CPT

## 2023-07-24 PROCEDURE — 86147 CARDIOLIPIN ANTIBODY EA IG: CPT

## 2023-07-24 PROCEDURE — 85610 PROTHROMBIN TIME: CPT

## 2023-07-24 PROCEDURE — 93005 ELECTROCARDIOGRAM TRACING: CPT

## 2023-07-24 PROCEDURE — 84145 PROCALCITONIN (PCT): CPT

## 2023-07-24 PROCEDURE — 86255 FLUORESCENT ANTIBODY SCREEN: CPT

## 2023-07-24 PROCEDURE — 84480 ASSAY TRIIODOTHYRONINE (T3): CPT

## 2023-07-24 PROCEDURE — 82728 ASSAY OF FERRITIN: CPT

## 2023-07-24 PROCEDURE — 71045 X-RAY EXAM CHEST 1 VIEW: CPT

## 2023-07-24 PROCEDURE — 93308 TTE F-UP OR LMTD: CPT

## 2023-07-24 PROCEDURE — 86618 LYME DISEASE ANTIBODY: CPT

## 2023-07-24 PROCEDURE — G1004: CPT

## 2023-07-24 PROCEDURE — 86480 TB TEST CELL IMMUN MEASURE: CPT

## 2023-07-24 PROCEDURE — 84443 ASSAY THYROID STIM HORMONE: CPT

## 2023-07-24 PROCEDURE — 95819 EEG AWAKE AND ASLEEP: CPT

## 2023-07-24 PROCEDURE — 86200 CCP ANTIBODY: CPT

## 2023-07-24 PROCEDURE — 70450 CT HEAD/BRAIN W/O DYE: CPT

## 2023-07-24 PROCEDURE — 85613 RUSSELL VIPER VENOM DILUTED: CPT

## 2023-07-24 PROCEDURE — 86235 NUCLEAR ANTIGEN ANTIBODY: CPT

## 2023-07-24 PROCEDURE — 84436 ASSAY OF TOTAL THYROXINE: CPT

## 2023-07-24 PROCEDURE — 82607 VITAMIN B-12: CPT

## 2023-07-24 PROCEDURE — 80053 COMPREHEN METABOLIC PANEL: CPT

## 2023-07-24 PROCEDURE — 82550 ASSAY OF CK (CPK): CPT

## 2023-07-24 PROCEDURE — 86431 RHEUMATOID FACTOR QUANT: CPT

## 2023-07-24 PROCEDURE — 85045 AUTOMATED RETICULOCYTE COUNT: CPT

## 2023-07-24 PROCEDURE — 85730 THROMBOPLASTIN TIME PARTIAL: CPT

## 2023-07-24 PROCEDURE — 83516 IMMUNOASSAY NONANTIBODY: CPT

## 2023-07-24 PROCEDURE — 74177 CT ABD & PELVIS W/CONTRAST: CPT

## 2023-07-24 PROCEDURE — 86146 BETA-2 GLYCOPROTEIN ANTIBODY: CPT

## 2023-07-24 PROCEDURE — 82962 GLUCOSE BLOOD TEST: CPT

## 2023-07-24 PROCEDURE — 86803 HEPATITIS C AB TEST: CPT

## 2023-07-24 PROCEDURE — 83540 ASSAY OF IRON: CPT

## 2023-07-24 PROCEDURE — 82746 ASSAY OF FOLIC ACID SERUM: CPT

## 2023-07-24 PROCEDURE — 85025 COMPLETE CBC W/AUTO DIFF WBC: CPT

## 2023-07-24 PROCEDURE — 86140 C-REACTIVE PROTEIN: CPT

## 2023-07-24 PROCEDURE — 93970 EXTREMITY STUDY: CPT

## 2023-07-24 PROCEDURE — 83735 ASSAY OF MAGNESIUM: CPT

## 2023-07-24 PROCEDURE — 84484 ASSAY OF TROPONIN QUANT: CPT

## 2023-07-24 PROCEDURE — 93306 TTE W/DOPPLER COMPLETE: CPT

## 2023-07-24 PROCEDURE — 85652 RBC SED RATE AUTOMATED: CPT

## 2023-07-24 PROCEDURE — 83615 LACTATE (LD) (LDH) ENZYME: CPT

## 2023-07-24 RX ORDER — FUROSEMIDE 40 MG
20 TABLET ORAL ONCE
Refills: 0 | Status: COMPLETED | OUTPATIENT
Start: 2023-07-24 | End: 2023-07-24

## 2023-07-24 RX ADMIN — Medication 20 MILLIGRAM(S): at 23:30

## 2023-07-24 NOTE — ED PROVIDER NOTE - OBJECTIVE STATEMENT
Patient with prior history of hypertension and anemia likely secondary to iron deficiency with negative colonoscopy and endoscopy who presents with shortness of breath since July 3 that is exertional associated with a pleuritic right sided rib pain.  No exertional chest pain.  Was seen at an outside ED found to have Pleural effusion.  Has follow-up with pulmonology.  No etiology noted.  Denies any leg swelling.

## 2023-07-24 NOTE — ED PROVIDER NOTE - EKG ADDITIONAL INFORMATION FREE TEXT
Heart rate 87 MI interval 180 QRS 84 QTc 490 sinus rhythm isolated T wave inversion V1 and V2 no ST elevation PACs

## 2023-07-24 NOTE — ED PROVIDER NOTE - CLINICAL SUMMARY MEDICAL DECISION MAKING FREE TEXT BOX
Patient evaluated for shortness of breath D-dimer was positive BNP was elevated CT angio showed bilateral pleural effusions.  Patient is symptomatic.  Given low-dose of furosemide.  Patient to be admitted for further evaluation

## 2023-07-25 DIAGNOSIS — Z90.49 ACQUIRED ABSENCE OF OTHER SPECIFIED PARTS OF DIGESTIVE TRACT: Chronic | ICD-10-CM

## 2023-07-25 LAB
ALBUMIN SERPL ELPH-MCNC: 2.6 G/DL — LOW (ref 3.5–5.2)
ALP SERPL-CCNC: 96 U/L — SIGNIFICANT CHANGE UP (ref 30–115)
ALT FLD-CCNC: 10 U/L — SIGNIFICANT CHANGE UP (ref 0–41)
ANION GAP SERPL CALC-SCNC: 14 MMOL/L — SIGNIFICANT CHANGE UP (ref 7–14)
APTT BLD: 32.5 SEC — SIGNIFICANT CHANGE UP (ref 27–39.2)
AST SERPL-CCNC: 23 U/L — SIGNIFICANT CHANGE UP (ref 0–41)
BASOPHILS # BLD AUTO: 0.02 K/UL — SIGNIFICANT CHANGE UP (ref 0–0.2)
BASOPHILS NFR BLD AUTO: 0.4 % — SIGNIFICANT CHANGE UP (ref 0–1)
BILIRUB SERPL-MCNC: 0.5 MG/DL — SIGNIFICANT CHANGE UP (ref 0.2–1.2)
BUN SERPL-MCNC: 7 MG/DL — LOW (ref 10–20)
CALCIUM SERPL-MCNC: 8.4 MG/DL — SIGNIFICANT CHANGE UP (ref 8.4–10.4)
CHLORIDE SERPL-SCNC: 100 MMOL/L — SIGNIFICANT CHANGE UP (ref 98–110)
CK SERPL-CCNC: 35 U/L — SIGNIFICANT CHANGE UP (ref 0–225)
CO2 SERPL-SCNC: 23 MMOL/L — SIGNIFICANT CHANGE UP (ref 17–32)
CREAT SERPL-MCNC: 0.8 MG/DL — SIGNIFICANT CHANGE UP (ref 0.7–1.5)
EGFR: 80 ML/MIN/1.73M2 — SIGNIFICANT CHANGE UP
EOSINOPHIL # BLD AUTO: 0.16 K/UL — SIGNIFICANT CHANGE UP (ref 0–0.7)
EOSINOPHIL NFR BLD AUTO: 3.4 % — SIGNIFICANT CHANGE UP (ref 0–8)
FERRITIN SERPL-MCNC: 1527 NG/ML — HIGH (ref 13–330)
GLUCOSE SERPL-MCNC: 86 MG/DL — SIGNIFICANT CHANGE UP (ref 70–99)
HCT VFR BLD CALC: 32.7 % — LOW (ref 37–47)
HCV AB S/CO SERPL IA: 0.04 COI — SIGNIFICANT CHANGE UP
HCV AB SERPL-IMP: SIGNIFICANT CHANGE UP
HGB BLD-MCNC: 10.5 G/DL — LOW (ref 12–16)
HIV 1+2 AB+HIV1 P24 AG SERPL QL IA: SIGNIFICANT CHANGE UP
IMM GRANULOCYTES NFR BLD AUTO: 0.2 % — SIGNIFICANT CHANGE UP (ref 0.1–0.3)
INR BLD: 1.14 RATIO — SIGNIFICANT CHANGE UP (ref 0.65–1.3)
LDH SERPL L TO P-CCNC: 506 — HIGH (ref 50–242)
LYMPHOCYTES # BLD AUTO: 0.9 K/UL — LOW (ref 1.2–3.4)
LYMPHOCYTES # BLD AUTO: 18.9 % — LOW (ref 20.5–51.1)
MAGNESIUM SERPL-MCNC: 1.6 MG/DL — LOW (ref 1.8–2.4)
MCHC RBC-ENTMCNC: 26.8 PG — LOW (ref 27–31)
MCHC RBC-ENTMCNC: 32.1 G/DL — SIGNIFICANT CHANGE UP (ref 32–37)
MCV RBC AUTO: 83.4 FL — SIGNIFICANT CHANGE UP (ref 81–99)
MONOCYTES # BLD AUTO: 0.2 K/UL — SIGNIFICANT CHANGE UP (ref 0.1–0.6)
MONOCYTES NFR BLD AUTO: 4.2 % — SIGNIFICANT CHANGE UP (ref 1.7–9.3)
NEUTROPHILS # BLD AUTO: 3.47 K/UL — SIGNIFICANT CHANGE UP (ref 1.4–6.5)
NEUTROPHILS NFR BLD AUTO: 72.9 % — SIGNIFICANT CHANGE UP (ref 42.2–75.2)
NRBC # BLD: 0 /100 WBCS — SIGNIFICANT CHANGE UP (ref 0–0)
PLATELET # BLD AUTO: 315 K/UL — SIGNIFICANT CHANGE UP (ref 130–400)
PMV BLD: 9.3 FL — SIGNIFICANT CHANGE UP (ref 7.4–10.4)
POTASSIUM SERPL-MCNC: 4 MMOL/L — SIGNIFICANT CHANGE UP (ref 3.5–5)
POTASSIUM SERPL-SCNC: 4 MMOL/L — SIGNIFICANT CHANGE UP (ref 3.5–5)
PROT SERPL-MCNC: 6.5 G/DL — SIGNIFICANT CHANGE UP (ref 6–8)
PROTHROM AB SERPL-ACNC: 13 SEC — HIGH (ref 9.95–12.87)
RBC # BLD: 3.92 M/UL — LOW (ref 4.2–5.4)
RBC # BLD: 3.92 M/UL — LOW (ref 4.2–5.4)
RBC # FLD: 16 % — HIGH (ref 11.5–14.5)
RETICS #: 55.3 K/UL — SIGNIFICANT CHANGE UP (ref 25–125)
RETICS/RBC NFR: 1.4 % — SIGNIFICANT CHANGE UP (ref 0.5–1.5)
RHEUMATOID FACT SERPL-ACNC: <10 IU/ML — SIGNIFICANT CHANGE UP (ref 0–13)
SODIUM SERPL-SCNC: 137 MMOL/L — SIGNIFICANT CHANGE UP (ref 135–146)
TROPONIN T SERPL-MCNC: <0.01 NG/ML — SIGNIFICANT CHANGE UP
TSH SERPL-MCNC: 5.72 UIU/ML — HIGH (ref 0.27–4.2)
WBC # BLD: 4.76 K/UL — LOW (ref 4.8–10.8)
WBC # FLD AUTO: 4.76 K/UL — LOW (ref 4.8–10.8)

## 2023-07-25 PROCEDURE — 74177 CT ABD & PELVIS W/CONTRAST: CPT | Mod: 26

## 2023-07-25 PROCEDURE — 99223 1ST HOSP IP/OBS HIGH 75: CPT

## 2023-07-25 PROCEDURE — 93010 ELECTROCARDIOGRAM REPORT: CPT

## 2023-07-25 PROCEDURE — 93306 TTE W/DOPPLER COMPLETE: CPT | Mod: 26

## 2023-07-25 RX ORDER — HYDRALAZINE HCL 50 MG
50 TABLET ORAL
Refills: 0 | Status: DISCONTINUED | OUTPATIENT
Start: 2023-07-25 | End: 2023-07-26

## 2023-07-25 RX ORDER — PANTOPRAZOLE SODIUM 20 MG/1
40 TABLET, DELAYED RELEASE ORAL
Refills: 0 | Status: DISCONTINUED | OUTPATIENT
Start: 2023-07-25 | End: 2023-07-31

## 2023-07-25 RX ORDER — FOLIC ACID 0.8 MG
1 TABLET ORAL
Refills: 0 | DISCHARGE

## 2023-07-25 RX ORDER — ENOXAPARIN SODIUM 100 MG/ML
40 INJECTION SUBCUTANEOUS EVERY 24 HOURS
Refills: 0 | Status: DISCONTINUED | OUTPATIENT
Start: 2023-07-25 | End: 2023-07-31

## 2023-07-25 RX ORDER — PANTOPRAZOLE SODIUM 20 MG/1
1 TABLET, DELAYED RELEASE ORAL
Refills: 0 | DISCHARGE

## 2023-07-25 RX ORDER — DIATRIZOATE MEGLUMINE 180 MG/ML
50 INJECTION, SOLUTION INTRAVESICAL ONCE
Refills: 0 | Status: COMPLETED | OUTPATIENT
Start: 2023-07-25 | End: 2023-07-25

## 2023-07-25 RX ORDER — FOLIC ACID 0.8 MG
1 TABLET ORAL DAILY
Refills: 0 | Status: DISCONTINUED | OUTPATIENT
Start: 2023-07-25 | End: 2023-07-31

## 2023-07-25 RX ORDER — FUROSEMIDE 40 MG
40 TABLET ORAL DAILY
Refills: 0 | Status: DISCONTINUED | OUTPATIENT
Start: 2023-07-25 | End: 2023-07-28

## 2023-07-25 RX ORDER — METOPROLOL TARTRATE 50 MG
100 TABLET ORAL
Refills: 0 | Status: DISCONTINUED | OUTPATIENT
Start: 2023-07-25 | End: 2023-07-31

## 2023-07-25 RX ORDER — ATORVASTATIN CALCIUM 80 MG/1
20 TABLET, FILM COATED ORAL AT BEDTIME
Refills: 0 | Status: DISCONTINUED | OUTPATIENT
Start: 2023-07-25 | End: 2023-07-31

## 2023-07-25 RX ORDER — PREGABALIN 225 MG/1
1000 CAPSULE ORAL DAILY
Refills: 0 | Status: DISCONTINUED | OUTPATIENT
Start: 2023-07-25 | End: 2023-07-31

## 2023-07-25 RX ORDER — MAGNESIUM SULFATE 500 MG/ML
2 VIAL (ML) INJECTION ONCE
Refills: 0 | Status: COMPLETED | OUTPATIENT
Start: 2023-07-25 | End: 2023-07-25

## 2023-07-25 RX ORDER — PREGABALIN 225 MG/1
1 CAPSULE ORAL
Refills: 0 | DISCHARGE

## 2023-07-25 RX ORDER — FUROSEMIDE 40 MG
20 TABLET ORAL DAILY
Refills: 0 | Status: DISCONTINUED | OUTPATIENT
Start: 2023-07-25 | End: 2023-07-25

## 2023-07-25 RX ORDER — METOPROLOL TARTRATE 50 MG
1 TABLET ORAL
Refills: 0 | DISCHARGE

## 2023-07-25 RX ADMIN — Medication 50 MILLIGRAM(S): at 17:54

## 2023-07-25 RX ADMIN — ATORVASTATIN CALCIUM 20 MILLIGRAM(S): 80 TABLET, FILM COATED ORAL at 21:29

## 2023-07-25 RX ADMIN — Medication 1 MILLIGRAM(S): at 13:19

## 2023-07-25 RX ADMIN — Medication 40 MILLIGRAM(S): at 17:54

## 2023-07-25 RX ADMIN — ENOXAPARIN SODIUM 40 MILLIGRAM(S): 100 INJECTION SUBCUTANEOUS at 17:54

## 2023-07-25 RX ADMIN — DIATRIZOATE MEGLUMINE 50 MILLILITER(S): 180 INJECTION, SOLUTION INTRAVESICAL at 20:17

## 2023-07-25 RX ADMIN — PANTOPRAZOLE SODIUM 40 MILLIGRAM(S): 20 TABLET, DELAYED RELEASE ORAL at 06:10

## 2023-07-25 RX ADMIN — Medication 100 MILLIGRAM(S): at 06:05

## 2023-07-25 RX ADMIN — Medication 20 MILLIGRAM(S): at 06:09

## 2023-07-25 RX ADMIN — Medication 100 MILLIGRAM(S): at 17:55

## 2023-07-25 RX ADMIN — Medication 25 GRAM(S): at 13:20

## 2023-07-25 RX ADMIN — Medication 50 MILLIGRAM(S): at 06:05

## 2023-07-25 RX ADMIN — PREGABALIN 1000 MICROGRAM(S): 225 CAPSULE ORAL at 13:19

## 2023-07-25 NOTE — CONSULT NOTE ADULT - ASSESSMENT
Impression;    SOB and bilateral pleural effusions likely volume overload   HO COVID 19 March 2023     Plan     Continue Diuresis.  Increase Lasxi to 40 mg daily   ECHO  LE duplex   CT Chest noted  Might need left thoracentesis   DVT prophylaxis   HOB at 45 degrees

## 2023-07-25 NOTE — H&P ADULT - HISTORY OF PRESENT ILLNESS
68 y/o female with pmhx of HT, HL and anemia likely secondary to iron deficiency presented to ED with dyspnea on exertion, weakness, weight loss and chest pain for 1 month which worsened since yesterday.     Chest pain is pleuritic, R>L since march 23. Dyspnea since march 23, was better after 1pRBC transfuisio   with negative colonoscopy and endoscopy who presents with shortness of breath since July 3 that is exertional associated with a pleuritic right sided rib pain.  No exertional chest pain.  Was seen at an outside ED found to have Pleural effusion.  Has follow-up with pulmonology.  No etiology noted.  Denies any leg swelling. 66 y/o female with pmhx of HT, HL and anemia likely secondary to iron deficiency presented to ED with dyspnea on exertion, weakness, weight loss and chest pain for 1 month which worsened since yesterday.     Chest pain is pleuritic, R>L since march 23. Dyspnea on exertion since march 23 - can walk 1-2 blocks. no orthopnea or PND. No night sweats, fevers or chills.   Wt loss 45lbs in 6 months. Loss of appetite since covid-19 in March 23.     Pt had colonoscopy and endoscopy which were normal per pt. No etiology noted.  Denies any leg swelling. Uptodate with age specific cancer screening.     In ED - BP - 180/80mmhg; rest vitals were wnl  Labs - D-dimer 5k and bnp -1.3k   CTA - no PE; b/l effusion L>R; mild pericardial effusion and prominent mediastinal nodes     Since pt had SOB, ED gave 20mg lasix. Patient is admitted for SOB workup.

## 2023-07-25 NOTE — H&P ADULT - NSHPPHYSICALEXAM_GEN_ALL_CORE
GENERAL: NAD, well-groomed, well-developed  HEAD:  Atraumatic, Normocephalic  EYES: EOMI, PERRLA, conjunctiva and sclera clear  ENMT: No tonsillar erythema, exudates, or enlargement; Moist mucous membranes  NECK: Supple, No JVD, Normal thyroid  HEART: Regular rate and rhythm; No murmurs, rubs, or gallops  RESPIRATORY: CTA B/L, No W/R/R; NO BS in Lt LL  ABDOMEN: Soft, Nontender, Nondistended; Bowel sounds present  NEUROLOGY: A&Ox3, nonfocal, moving all extremities  EXTREMITIES:  2+ Peripheral Pulses, No clubbing, cyanosis, or edema  SKIN: warm, dry, normal color, no rash or abnormal lesions

## 2023-07-25 NOTE — H&P ADULT - NSHPLABSRESULTS_GEN_ALL_CORE
10.0   4.62  )-----------( 299      ( 24 Jul 2023 19:58 )             31.5       07-24    137  |  101  |  8<L>  ----------------------------<  90  3.6   |  25  |  0.7    Ca    8.2<L>      24 Jul 2023 19:58  Mg     1.7     07-24    TPro  5.8<L>  /  Alb  2.6<L>  /  TBili  0.7  /  DBili  x   /  AST  16  /  ALT  8   /  AlkPhos  94  07-24          Urinalysis Basic - ( 24 Jul 2023 19:58 )    Color: x / Appearance: x / SG: x / pH: x  Gluc: 90 mg/dL / Ketone: x  / Bili: x / Urobili: x   Blood: x / Protein: x / Nitrite: x   Leuk Esterase: x / RBC: x / WBC x   Sq Epi: x / Non Sq Epi: x / Bacteria: x      CARDIAC MARKERS ( 24 Jul 2023 19:58 )  x     / <0.01 ng/mL / x     / x     / x

## 2023-07-25 NOTE — H&P ADULT - NSHPREVIEWOFSYSTEMS_GEN_ALL_CORE
CONSTITUTIONAL: No weakness, fevers or chills  EYES/ENT: No visual changes;  No vertigo or throat pain   NECK: No pain or stiffness  RESPIRATORY: SOB on exertion, pleuritic CP. No cough, wheezing, hemoptysis;   CARDIOVASCULAR: No chest pain or palpitations  GASTROINTESTINAL: No abdominal or epigastric pain. No nausea, vomiting, or hematemesis; No diarrhea or constipation. No melena or hematochezia.  GENITOURINARY: No dysuria, frequency or hematuria  NEUROLOGICAL: No numbness or weakness  SKIN: No itching, rashes

## 2023-07-25 NOTE — H&P ADULT - ASSESSMENT
68 y/o female with pmhx of HT, HL and anemia likely secondary to iron deficiency presented to ED with dyspnea on exertion, weakness, weight loss and chest pain for 1 month which worsened since yesterday.     #Pleuritic Chest pain  #Dyspnea on exertion   #Weight loss - 45lbs in 6 months   #Loss of appetite   #Anemia   - Symptoms started since March 23  - Pt has       #Polymyalgia Rheumatica   - was previously on Mtx and prednisone   - no meds or symptoms now   - follows with Dr Salazar      #HT  #HL   - c/w home meds - metoprolol and hydralazine   - c/w lipitor 20mg   - Lipids and a1c in AM       VTE - lovenox   GI pantop  ambulate as tolerated   DASH diet   FULLCODE    68 y/o female with pmhx of HT, HL and anemia likely secondary to iron deficiency presented to ED with dyspnea on exertion, weakness, weight loss and chest pain for 1 month which worsened since yesterday.       #Pleuritic Chest pain  #Dyspnea on exertion   #Weight loss - 45lbs in 6 months   #Loss of appetite   - Symptomatic since March 23  - Labs - D-dimer 5k and bnp -1.3k   - CTA - no PE; b/l effusion L>R; mild pericardial effusion and prominent mediastinal nodes   - Pulm consult for thoracentesis   - ?consider CT abdomen/pelvis to look for lympadenopathy/ pelvic mass  - HAIM, RF, HIV, LDH and routine lab work   -  TTE and lasix 20mg PO       #Anemia 2/2 suspected MERRICK  - received 1pRBC 20days back and iron IV in OP  - last hb 10      #Polymyalgia Rheumatica   - was previously on Mtx and prednisone   - no meds or symptoms now   - follows with Dr Salazar      #HT  #HL   - c/w home meds - metoprolol and hydralazine   - c/w lipitor 20mg   - Lipids and a1c in AM      #GERD   -c/w pantop        VTE - lovenox   GI pantop  ambulate as tolerated   DASH diet   FULLCODE

## 2023-07-25 NOTE — H&P ADULT - ATTENDING COMMENTS
Pt seen and examined in ED4 today. History confirmed and as above. Wife at bedside. Pt presents with shortness of breath since March 2023, pleuritic right sided pain, wt loss of 45lbs in 6 months. Quit tobacco in 1998. No fever, chills, cough, N/V, abdominal pain, recent travel. She went to [facility] 2 weeks ago and was told that she had pleural effusions and was sent home. ROS negative    T(F): 97.5 (07-25-23 @ 07:37), Max: 98.1 (07-25-23 @ 05:35)  HR: 69 (07-25-23 @ 07:37) (69 - 97)  BP: 137/65 (07-25-23 @ 07:37) (137/65 - 180/91)  RR: 18 (07-25-23 @ 07:37) (18 - 18)  SpO2: 98% (07-25-23 @ 07:37) (97% - 100%) on RA    PHYSICAL EXAM:  GENERAL: NAD  HEAD:  Normocephalic  EYES:  conjunctiva and sclera clear  ENMT: Moist mucous membranes  NERVOUS SYSTEM:  Alert, awake, Good concentration  CHEST/LUNG: decreased BS of left base, otherwise CTA  HEART: Regular rate and rhythm; No murmurs  ABDOMEN: Soft, Nontender, Nondistended; Bowel sounds present  EXTREMITIES:   + ankle edema b/l (improved per pt after lasix)  SKIN: warm, dry    Consultant(s) Notes Reviewed:  [x ] YES  [ ] NO  Care Discussed with Consultants/Other Providers [ x] YES  [ ] NO    LABS:                        10.5   4.76  )-----------( 315      ( 25 Jul 2023 06:29 )             32.7     07-25    137  |  100  |  7<L>  ----------------------------<  86  4.0   |  23  |  0.8    Ca    8.4      25 Jul 2023 06:29  Mg     1.6     07-25  TPro  6.5  /  Alb  2.6<L>  /  TBili  0.5  /  DBili  x   /  AST  23  /  ALT  10  /  AlkPhos  96  07-25  PT/INR - ( 25 Jul 2023 06:29 )   PT: 13.00 sec;   INR: 1.14 ratio  PTT - ( 25 Jul 2023 06:29 )  PTT:32.5 sec    RADIOLOGY & ADDITIONAL TESTS:  Imaging and report Personally Reviewed:  [x ] YES  [ ] NO    Case discussed with resident on rounds today  Care discussed with pt    68 y/o woman with PMH of tobacco use (quit in 1998), HTN, hyperlipidemia and MERRICK presents with dyspnea on exertion, pleuritic right sided pain and wt loss. She was found to have b/l pleural effusions and small pericardial effusion on CT scan.     1. B/L pleural effusions (left >right) and small pericardial effusion  CTA: No central or segmental pulmonary embolus. Bilateral pleural effusions with adjacent atelectasis. Small pericardial effusion  proBNP 1328, troponin negative, EKG reviewed by me: NSR, inverted T waves in V3, V4  ECHO: EF 67%, small pericardial effusion  on lasix for diuresis  pulm consult appreciated - continue diuresis for now  consider left thoracentesis to establish diagnosis  check TSH level, HAIM, RF, ESR, HIV  diff dx includes infectious (including TB), inflammatory and malignancy    elevated ddimer 5K - f/u duplex of LE  f/u CT scan of abdomen and pelvis   up to date with age appropriate cancer screening per HPI    2. Hypomagnesemia repleted  3. HTN - on metoprolol and hydralazine  4. Hyperlipidemia on statin  5. h/o PMR - was treated with methotrexate and steroids in the past   6. Iron deficiency anemia - H/H stable  7. Suspected vit B12 and folate deficiencies - on supplements  8. DVT prophylaxis - lovenox    full code  guarded prognosis

## 2023-07-26 LAB
ALBUMIN SERPL ELPH-MCNC: 2.7 G/DL — LOW (ref 3.5–5.2)
ALP SERPL-CCNC: 92 U/L — SIGNIFICANT CHANGE UP (ref 30–115)
ALT FLD-CCNC: 7 U/L — SIGNIFICANT CHANGE UP (ref 0–41)
ANA PAT FLD IF-IMP: ABNORMAL
ANA TITR SER: ABNORMAL
ANION GAP SERPL CALC-SCNC: 11 MMOL/L — SIGNIFICANT CHANGE UP (ref 7–14)
ANION GAP SERPL CALC-SCNC: 12 MMOL/L — SIGNIFICANT CHANGE UP (ref 7–14)
AST SERPL-CCNC: 13 U/L — SIGNIFICANT CHANGE UP (ref 0–41)
BASOPHILS # BLD AUTO: 0.01 K/UL — SIGNIFICANT CHANGE UP (ref 0–0.2)
BASOPHILS NFR BLD AUTO: 0.2 % — SIGNIFICANT CHANGE UP (ref 0–1)
BILIRUB SERPL-MCNC: 0.5 MG/DL — SIGNIFICANT CHANGE UP (ref 0.2–1.2)
BUN SERPL-MCNC: 7 MG/DL — LOW (ref 10–20)
BUN SERPL-MCNC: 8 MG/DL — LOW (ref 10–20)
CALCIUM SERPL-MCNC: 8.2 MG/DL — LOW (ref 8.4–10.5)
CALCIUM SERPL-MCNC: 8.3 MG/DL — LOW (ref 8.4–10.4)
CHLORIDE SERPL-SCNC: 94 MMOL/L — LOW (ref 98–110)
CHLORIDE SERPL-SCNC: 94 MMOL/L — LOW (ref 98–110)
CO2 SERPL-SCNC: 29 MMOL/L — SIGNIFICANT CHANGE UP (ref 17–32)
CO2 SERPL-SCNC: 29 MMOL/L — SIGNIFICANT CHANGE UP (ref 17–32)
CREAT SERPL-MCNC: 0.8 MG/DL — SIGNIFICANT CHANGE UP (ref 0.7–1.5)
CREAT SERPL-MCNC: 0.9 MG/DL — SIGNIFICANT CHANGE UP (ref 0.7–1.5)
EGFR: 69 ML/MIN/1.73M2 — SIGNIFICANT CHANGE UP
EGFR: 80 ML/MIN/1.73M2 — SIGNIFICANT CHANGE UP
EOSINOPHIL # BLD AUTO: 0.13 K/UL — SIGNIFICANT CHANGE UP (ref 0–0.7)
EOSINOPHIL NFR BLD AUTO: 2.1 % — SIGNIFICANT CHANGE UP (ref 0–8)
GLUCOSE SERPL-MCNC: 90 MG/DL — SIGNIFICANT CHANGE UP (ref 70–99)
GLUCOSE SERPL-MCNC: 91 MG/DL — SIGNIFICANT CHANGE UP (ref 70–99)
HCT VFR BLD CALC: 31.5 % — LOW (ref 37–47)
HGB BLD-MCNC: 10.2 G/DL — LOW (ref 12–16)
IMM GRANULOCYTES NFR BLD AUTO: 0.3 % — SIGNIFICANT CHANGE UP (ref 0.1–0.3)
LYMPHOCYTES # BLD AUTO: 0.69 K/UL — LOW (ref 1.2–3.4)
LYMPHOCYTES # BLD AUTO: 11.1 % — LOW (ref 20.5–51.1)
MAGNESIUM SERPL-MCNC: 1.9 MG/DL — SIGNIFICANT CHANGE UP (ref 1.8–2.4)
MCHC RBC-ENTMCNC: 26.8 PG — LOW (ref 27–31)
MCHC RBC-ENTMCNC: 32.4 G/DL — SIGNIFICANT CHANGE UP (ref 32–37)
MCV RBC AUTO: 82.9 FL — SIGNIFICANT CHANGE UP (ref 81–99)
MONOCYTES # BLD AUTO: 0.28 K/UL — SIGNIFICANT CHANGE UP (ref 0.1–0.6)
MONOCYTES NFR BLD AUTO: 4.5 % — SIGNIFICANT CHANGE UP (ref 1.7–9.3)
NEUTROPHILS # BLD AUTO: 5.09 K/UL — SIGNIFICANT CHANGE UP (ref 1.4–6.5)
NEUTROPHILS NFR BLD AUTO: 81.8 % — HIGH (ref 42.2–75.2)
NRBC # BLD: 0 /100 WBCS — SIGNIFICANT CHANGE UP (ref 0–0)
PLATELET # BLD AUTO: 311 K/UL — SIGNIFICANT CHANGE UP (ref 130–400)
PMV BLD: 9.3 FL — SIGNIFICANT CHANGE UP (ref 7.4–10.4)
POTASSIUM SERPL-MCNC: 3.3 MMOL/L — LOW (ref 3.5–5)
POTASSIUM SERPL-MCNC: 4 MMOL/L — SIGNIFICANT CHANGE UP (ref 3.5–5)
POTASSIUM SERPL-SCNC: 3.3 MMOL/L — LOW (ref 3.5–5)
POTASSIUM SERPL-SCNC: 4 MMOL/L — SIGNIFICANT CHANGE UP (ref 3.5–5)
PROT SERPL-MCNC: 6.2 G/DL — SIGNIFICANT CHANGE UP (ref 6–8)
RBC # BLD: 3.8 M/UL — LOW (ref 4.2–5.4)
RBC # FLD: 16.1 % — HIGH (ref 11.5–14.5)
SODIUM SERPL-SCNC: 134 MMOL/L — LOW (ref 135–146)
SODIUM SERPL-SCNC: 135 MMOL/L — SIGNIFICANT CHANGE UP (ref 135–146)
T3 SERPL-MCNC: 92 NG/DL — SIGNIFICANT CHANGE UP (ref 80–200)
T4 AB SER-ACNC: 7.7 UG/DL — SIGNIFICANT CHANGE UP (ref 4.6–12)
WBC # BLD: 6.22 K/UL — SIGNIFICANT CHANGE UP (ref 4.8–10.8)
WBC # FLD AUTO: 6.22 K/UL — SIGNIFICANT CHANGE UP (ref 4.8–10.8)

## 2023-07-26 PROCEDURE — 71045 X-RAY EXAM CHEST 1 VIEW: CPT | Mod: 26

## 2023-07-26 PROCEDURE — 99232 SBSQ HOSP IP/OBS MODERATE 35: CPT

## 2023-07-26 PROCEDURE — 99222 1ST HOSP IP/OBS MODERATE 55: CPT

## 2023-07-26 PROCEDURE — 93970 EXTREMITY STUDY: CPT | Mod: 26

## 2023-07-26 RX ORDER — POLYETHYLENE GLYCOL 3350 17 G/17G
17 POWDER, FOR SOLUTION ORAL DAILY
Refills: 0 | Status: DISCONTINUED | OUTPATIENT
Start: 2023-07-26 | End: 2023-07-31

## 2023-07-26 RX ORDER — POTASSIUM CHLORIDE 20 MEQ
40 PACKET (EA) ORAL ONCE
Refills: 0 | Status: COMPLETED | OUTPATIENT
Start: 2023-07-26 | End: 2023-07-26

## 2023-07-26 RX ORDER — CHLORHEXIDINE GLUCONATE 213 G/1000ML
1 SOLUTION TOPICAL
Refills: 0 | Status: COMPLETED | OUTPATIENT
Start: 2023-07-26 | End: 2023-07-30

## 2023-07-26 RX ADMIN — PANTOPRAZOLE SODIUM 40 MILLIGRAM(S): 20 TABLET, DELAYED RELEASE ORAL at 05:56

## 2023-07-26 RX ADMIN — Medication 40 MILLIGRAM(S): at 05:56

## 2023-07-26 RX ADMIN — Medication 100 MILLIGRAM(S): at 18:07

## 2023-07-26 RX ADMIN — Medication 40 MILLIEQUIVALENT(S): at 13:05

## 2023-07-26 RX ADMIN — ENOXAPARIN SODIUM 40 MILLIGRAM(S): 100 INJECTION SUBCUTANEOUS at 18:05

## 2023-07-26 RX ADMIN — Medication 100 MILLIGRAM(S): at 05:57

## 2023-07-26 RX ADMIN — Medication 1 MILLIGRAM(S): at 11:23

## 2023-07-26 RX ADMIN — PREGABALIN 1000 MICROGRAM(S): 225 CAPSULE ORAL at 11:23

## 2023-07-26 RX ADMIN — ATORVASTATIN CALCIUM 20 MILLIGRAM(S): 80 TABLET, FILM COATED ORAL at 22:04

## 2023-07-26 NOTE — CONSULT NOTE ADULT - SUBJECTIVE AND OBJECTIVE BOX
Patient is a 67y old  Female who presents with a chief complaint of SOB and weakness (25 Jul 2023 01:27)      HPI:  66 y/o female with pmhx of HT, HL and anemia likely secondary to iron deficiency presented to ED with dyspnea on exertion, weakness, weight loss and chest pain for 1 month which worsened since yesterday.     Chest pain is pleuritic, R>L since march 23. Dyspnea on exertion since march 23 - can walk 1-2 blocks. no orthopnea or PND. No night sweats, fevers or chills.   Wt loss 45lbs in 6 months. Loss of appetite since covid-19 in March 23.     Pt had colonoscopy and endoscopy which were normal per pt. No etiology noted.  Denies any leg swelling. Uptodate with age specific cancer screening.     In ED - BP - 180/80mmhg; rest vitals were wnl  Labs - D-dimer 5k and bnp -1.3k   CTA - no PE; b/l effusion L>R; mild pericardial effusion and prominent mediastinal nodes     Since pt had SOB, ED gave 20mg lasix. Patient is admitted for SOB workup.  (25 Jul 2023 01:27)      PAST MEDICAL & SURGICAL HISTORY:  Hypertension      Hyperlipidemia      S/P cholecystectomy          SOCIAL HX:   Smoking     Quit 1998                     ETOH                            Other    FAMILY HISTORY:  :  No known cardiovacular family hisotry     Review Of Systems:     All ROS are negative except per HPI       Allergies    codeine (Hives)    Intolerances          PHYSICAL EXAM    ICU Vital Signs Last 24 Hrs  T(C): 36.7 (25 Jul 2023 05:35), Max: 36.7 (25 Jul 2023 05:35)  T(F): 98.1 (25 Jul 2023 05:35), Max: 98.1 (25 Jul 2023 05:35)  HR: 97 (25 Jul 2023 05:35) (85 - 97)  BP: 171/75 (25 Jul 2023 05:35) (171/75 - 180/91)  BP(mean): --  ABP: --  ABP(mean): --  RR: 18 (25 Jul 2023 05:35) (18 - 18)  SpO2: 98% (25 Jul 2023 05:35) (97% - 100%)    O2 Parameters below as of 25 Jul 2023 05:35  Patient On (Oxygen Delivery Method): room air            CONSTITUTIONAL:  NAD    ENT:   Airway patent,   Mouth with normal mucosa.     CARDIAC:   Normal rate,   Regular rhythm.    No edema    RESPIRATORY:   No wheezing  Bilateral BS   Dec BS noth bases   Not tachypneic,  No use of accessory muscles    GASTROINTESTINAL:  Abdomen soft,   Non-tender,   No guarding,   + BS      NEUROLOGICAL:   Alert and oriented   No motor deficits.    SKIN:   Skin normal color for race,   No evidence of rash.              LABS:                          10.5   4.76  )-----------( 315      ( 25 Jul 2023 06:29 )             32.7                                               07-24    137  |  101  |  8<L>  ----------------------------<  90  3.6   |  25  |  0.7    Ca    8.2<L>      24 Jul 2023 19:58  Mg     1.7     07-24    TPro  5.8<L>  /  Alb  2.6<L>  /  TBili  0.7  /  DBili  x   /  AST  16  /  ALT  8   /  AlkPhos  94  07-24                                             Urinalysis Basic - ( 24 Jul 2023 19:58 )    Color: x / Appearance: x / SG: x / pH: x  Gluc: 90 mg/dL / Ketone: x  / Bili: x / Urobili: x   Blood: x / Protein: x / Nitrite: x   Leuk Esterase: x / RBC: x / WBC x   Sq Epi: x / Non Sq Epi: x / Bacteria: x        CARDIAC MARKERS ( 24 Jul 2023 19:58 )  x     / <0.01 ng/mL / x     / x     / x                                                LIVER FUNCTIONS - ( 24 Jul 2023 19:58 )  Alb: 2.6 g/dL / Pro: 5.8 g/dL / ALK PHOS: 94 U/L / ALT: 8 U/L / AST: 16 U/L / GGT: x                                                                                                                                         MEDICATIONS  (STANDING):  atorvastatin 20 milliGRAM(s) Oral at bedtime  cyanocobalamin 1000 MICROGram(s) Oral daily  folic acid 1 milliGRAM(s) Oral daily  furosemide    Tablet 20 milliGRAM(s) Oral daily  hydrALAZINE 50 milliGRAM(s) Oral two times a day  metoprolol tartrate 100 milliGRAM(s) Oral two times a day  pantoprazole    Tablet 40 milliGRAM(s) Oral before breakfast    MEDICATIONS  (PRN):        
Rheumatology CONSULT     Patient is a 67y old  Female who presents with a chief complaint of SOB and weakness (26 Jul 2023 13:42)      HPI:  68 y/o female with pmhx of HT, HL and anemia likely secondary to iron deficiency presented to ED with dyspnea on exertion, weakness, weight loss and chest pain for 1 month which worsened since yesterday.     Chest pain is pleuritic, R>L since march 23. Dyspnea on exertion since march 23 - can walk 1-2 blocks. no orthopnea or PND. No night sweats, fevers or chills.   Wt loss 45lbs in 6 months. Loss of appetite since covid-19 in March 23.     Pt had colonoscopy and endoscopy which were normal per pt. No etiology noted.  Denies any leg swelling. Uptodate with age specific cancer screening.     In ED - BP - 180/80mmhg; rest vitals were wnl  Labs - D-dimer 5k and bnp -1.3k   CTA - no PE; b/l effusion L>R; mild pericardial effusion and prominent mediastinal nodes     Since pt had SOB, ED gave 20mg lasix. Patient is admitted for SOB workup.  (25 Jul 2023 01:27)       ROS:  Negative except for:    PAST MEDICAL & SURGICAL HISTORY:  Hypertension      Hyperlipidemia      S/P cholecystectomy          SOCIAL HISTORY:    FAMILY HISTORY:      MEDICATIONS  (STANDING):  atorvastatin 20 milliGRAM(s) Oral at bedtime  cyanocobalamin 1000 MICROGram(s) Oral daily  enoxaparin Injectable 40 milliGRAM(s) SubCutaneous every 24 hours  folic acid 1 milliGRAM(s) Oral daily  furosemide    Tablet 40 milliGRAM(s) Oral daily  metoprolol tartrate 100 milliGRAM(s) Oral two times a day  pantoprazole    Tablet 40 milliGRAM(s) Oral before breakfast    MEDICATIONS  (PRN):      Allergies    codeine (Hives)    Intolerances        Vital Signs Last 24 Hrs  T(C): 36.8 (26 Jul 2023 08:05), Max: 36.9 (26 Jul 2023 05:49)  T(F): 98.2 (26 Jul 2023 08:05), Max: 98.5 (26 Jul 2023 05:49)  HR: 67 (26 Jul 2023 08:05) (67 - 79)  BP: 118/58 (26 Jul 2023 08:05) (105/53 - 135/66)  BP(mean): 83 (26 Jul 2023 00:05) (83 - 83)  RR: 18 (26 Jul 2023 08:05) (18 - 18)  SpO2: 97% (26 Jul 2023 08:05) (96% - 97%)    Parameters below as of 26 Jul 2023 08:05  Patient On (Oxygen Delivery Method): room air        PHYSICAL EXAM  General: adult in NAD  HEENT: clear oropharynx, anicteric sclera, pink conjunctiva  Neck: supple  CV: normal S1/S2 with no murmur rubs or gallops  Lungs: Dec BS noth bases   Abdomen: soft non-tender non-distended, no hepatosplenomegaly  Ext: no clubbing cyanosis or edema  Skin: no rashes and no petechiae  Neuro: alert and oriented X 4, no focal deficits      LABS:                          10.2   6.22  )-----------( 311      ( 26 Jul 2023 07:59 )             31.5         Mean Cell Volume : 82.9 fL  Mean Cell Hemoglobin : 26.8 pg  Mean Cell Hemoglobin Concentration : 32.4 g/dL  Auto Neutrophil # : 5.09 K/uL  Auto Lymphocyte # : 0.69 K/uL  Auto Monocyte # : 0.28 K/uL  Auto Eosinophil # : 0.13 K/uL  Auto Basophil # : 0.01 K/uL  Auto Neutrophil % : 81.8 %  Auto Lymphocyte % : 11.1 %  Auto Monocyte % : 4.5 %  Auto Eosinophil % : 2.1 %  Auto Basophil % : 0.2 %      07-26    135  |  94<L>  |  7<L>  ----------------------------<  91  3.3<L>   |  29  |  0.8    Ca    8.2<L>      26 Jul 2023 07:59  Mg     1.9     07-26    TPro  6.5  /  Alb  2.6<L>  /  TBili  0.5  /  DBili  x   /  AST  23  /  ALT  10  /  AlkPhos  96  07-25      PT/INR - ( 25 Jul 2023 06:29 )   PT: 13.00 sec;   INR: 1.14 ratio         PTT - ( 25 Jul 2023 06:29 )  PTT:32.5 sec    Ferritin: 1527 ng/mL (07-25 @ 06:29)  Reticulocyte Percent: 1.4 % (07-25 @ 06:29)              BLOOD SMEAR INTERPRETATION:     < from: CT Angio Chest PE Protocol w/ IV Cont (07.24.23 @ 22:08) >  FINDINGS:    PULMONARY EMBOLUS: No central or segmental pulmonary embolus.    LUNGS, PLEURA, AND AIRWAYS: Patent central airways. Left greater than   right small bilateral pleural effusions with adjacent consolidation,   likely atelectasis. No pneumothorax.    MEDIASTINUM/LYMPH NODES: Scattered prominent mediastinal lymph nodes.   Unremarkable thyroid gland.    HEART/GREAT VESSELS: Normal heart size. Small pericardial fluid. Normal   caliber main pulmonary artery. Normal caliber thoracic aorta. Coronary   artery and aortic calcifications.    VISUALIZED UPPER ABDOMEN: Unremarkable.    BONES AND SOFT TISSUES: Mild degenerative changes of the spine.      IMPRESSION:    1.  No central or segmental pulmonary embolus.  2.  Bilateral pleural effusions with adjacent atelectasis.  3.  Small pericardial effusion.    --- End of Report ---    < end of copied text >    RADIOLOGY & ADDITIONAL STUDIES:

## 2023-07-26 NOTE — CONSULT NOTE ADULT - ATTENDING COMMENTS
68 y/o woman with h/o PMR admitted with weakness, dyspnea on exertion and weight loss, rheumatology consulted for high positive HAIM and pleural/pericardial effusions. Pt was diagnosed with PMR in 2022, when she developed stiffness in her hands and her shoulders. She is a patient of Dr. Roseann Claros. She was previously taking prednisone and methotrexate, but tapered off prednisone in early 2023 and discontinued methotrexate in May 2023. Pt reports 45 lb weight loss in the past 8 months, along with dyspnea on exertion and generalized weakness. She denies fevers, cough, rashes, recent travel, current joint pain or swelling. Her exam today is non-contributory. She had labs during this admission which demonstrated HAIM >1:2560, negative RF, normal TSH, negative CK. She had a TTE which demonstrated a moderate L pleural effusion and a small pericardial effusion and a CT chest which demonstrated small bilateral pleural and a pericardial effusion. Given pt's presentation, the etiology of her pleural and pericardial effusions includes autoimmune disease, infections and malignancy, all of which can be a/w an elevated HAIM. PMR can rarely be a/w pericardial and pleural effusions and can also be a/w positive HAIM, but pt would benefit from a workup for other causes including systemic connective tissue diseases and infections, especially as she is not currently having typical symptoms of PMR. She did not have any specific findings suspicious for a malignant process on her CT chest/abdomen/pelvis.  - Please send ANCA, cyclic citrullinated peptide, quantiferon, ESR, CRP, double stranded DNA, PRASANTH, Sjogren's antibodies, beta 2 GP 1 IgG and IgM, anticardiolipin IgG and IgM, lupus anticoagulant, RNA polymerase III, centromere, scleroderma antibody, urinalysis, urine protein/creatinine ratio  - Pt had normal TSH, negative hepatitis C, negative rheumatoid factor and normal CK during this admission

## 2023-07-26 NOTE — PROGRESS NOTE ADULT - SUBJECTIVE AND OBJECTIVE BOX
Patient is a 67y old  Female who presents with a chief complaint of SOB and weakness (26 Jul 2023 13:42)      SUBJECTIVE / OVERNIGHT EVENTS:  ADDITIONAL REVIEW OF SYSTEMS:    MEDICATIONS  (STANDING):  atorvastatin 20 milliGRAM(s) Oral at bedtime  cyanocobalamin 1000 MICROGram(s) Oral daily  enoxaparin Injectable 40 milliGRAM(s) SubCutaneous every 24 hours  folic acid 1 milliGRAM(s) Oral daily  furosemide    Tablet 40 milliGRAM(s) Oral daily  metoprolol tartrate 100 milliGRAM(s) Oral two times a day  pantoprazole    Tablet 40 milliGRAM(s) Oral before breakfast    MEDICATIONS  (PRN):      CAPILLARY BLOOD GLUCOSE        I&O's Summary      PHYSICAL EXAM:  Vital Signs Last 24 Hrs  T(C): 36.6 (26 Jul 2023 15:55), Max: 36.9 (26 Jul 2023 05:49)  T(F): 97.9 (26 Jul 2023 15:55), Max: 98.5 (26 Jul 2023 05:49)  HR: 75 (26 Jul 2023 15:55) (67 - 77)  BP: 127/57 (26 Jul 2023 15:55) (105/53 - 127/59)  BP(mean): 83 (26 Jul 2023 00:05) (83 - 83)  RR: 18 (26 Jul 2023 15:55) (18 - 18)  SpO2: 95% (26 Jul 2023 15:55) (95% - 97%)    Parameters below as of 26 Jul 2023 15:55  Patient On (Oxygen Delivery Method): room air      CONSTITUTIONAL: NAD, normal weight  ENMT: Moist oral mucosa, no pharyngeal injection or exudates; normal dentition  NECK: Supple, no palpable masses; no thyromegaly  RESPIRATORY: Normal respiratory effort; decreased breath sounds in left lower lobe  CARDIOVASCULAR: Regular rate and rhythm, normal S1 and S2, no murmur/rub/gallop; No lower extremity edema; Peripheral pulses are 2+ bilaterally  ABDOMEN: Nontender to palpation, normoactive bowel sounds, no rebound/guarding; No hepatosplenomegaly  MUSCULOSKELETAL:  Normal gait; no clubbing or cyanosis of digits; no joint swelling or tenderness to palpation  PSYCH: A+O to person, place, and time; affect appropriate  SKIN: No rashes; no palpable lesions    LABS:                        10.2   6.22  )-----------( 311      ( 26 Jul 2023 07:59 )             31.5     07-26    135  |  94<L>  |  7<L>  ----------------------------<  91  3.3<L>   |  29  |  0.8    Ca    8.2<L>      26 Jul 2023 07:59  Mg     1.9     07-26    TPro  6.5  /  Alb  2.6<L>  /  TBili  0.5  /  DBili  x   /  AST  23  /  ALT  10  /  AlkPhos  96  07-25    PT/INR - ( 25 Jul 2023 06:29 )   PT: 13.00 sec;   INR: 1.14 ratio         PTT - ( 25 Jul 2023 06:29 )  PTT:32.5 sec  CARDIAC MARKERS ( 25 Jul 2023 06:29 )  x     / <0.01 ng/mL / 35 U/L / x     / x      CARDIAC MARKERS ( 24 Jul 2023 19:58 )  x     / <0.01 ng/mL / x     / x     / x          Urinalysis Basic - ( 26 Jul 2023 07:59 )    Color: x / Appearance: x / SG: x / pH: x  Gluc: 91 mg/dL / Ketone: x  / Bili: x / Urobili: x   Blood: x / Protein: x / Nitrite: x   Leuk Esterase: x / RBC: x / WBC x   Sq Epi: x / Non Sq Epi: x / Bacteria: x          RADIOLOGY & ADDITIONAL TESTS:  Results Reviewed:   Imaging Personally Reviewed:  Electrocardiogram Personally Reviewed:    COORDINATION OF CARE:  Care Discussed with Consultants/Other Providers [Y/N]:  Prior or Outpatient Records Reviewed [Y/N]:         Patient is a 67y old  Female who presents with a chief complaint of SOB and weakness (26 Jul 2023 13:42)      SUBJECTIVE / OVERNIGHT EVENTS: Spoke to pt and her partner bedside. Discussed her age related cancer screenings and family history. Complaining of metallic taste in her mouth even though her appetite returned so she doesn't feel like eating.   ADDITIONAL REVIEW OF SYSTEMS: as above    MEDICATIONS  (STANDING):  atorvastatin 20 milliGRAM(s) Oral at bedtime  cyanocobalamin 1000 MICROGram(s) Oral daily  enoxaparin Injectable 40 milliGRAM(s) SubCutaneous every 24 hours  folic acid 1 milliGRAM(s) Oral daily  furosemide    Tablet 40 milliGRAM(s) Oral daily  metoprolol tartrate 100 milliGRAM(s) Oral two times a day  pantoprazole    Tablet 40 milliGRAM(s) Oral before breakfast    MEDICATIONS  (PRN):      CAPILLARY BLOOD GLUCOSE        I&O's Summary      PHYSICAL EXAM:  Vital Signs Last 24 Hrs  T(C): 36.6 (26 Jul 2023 15:55), Max: 36.9 (26 Jul 2023 05:49)  T(F): 97.9 (26 Jul 2023 15:55), Max: 98.5 (26 Jul 2023 05:49)  HR: 75 (26 Jul 2023 15:55) (67 - 77)  BP: 127/57 (26 Jul 2023 15:55) (105/53 - 127/59)  BP(mean): 83 (26 Jul 2023 00:05) (83 - 83)  RR: 18 (26 Jul 2023 15:55) (18 - 18)  SpO2: 95% (26 Jul 2023 15:55) (95% - 97%)    Parameters below as of 26 Jul 2023 15:55  Patient On (Oxygen Delivery Method): room air      CONSTITUTIONAL: NAD, normal weight  ENMT: Moist oral mucosa, no pharyngeal injection or exudates; normal dentition  NECK: Supple, no palpable masses; no thyromegaly  RESPIRATORY: Normal respiratory effort; decreased breath sounds in left lower lobe  CARDIOVASCULAR: Regular rate and rhythm, normal S1 and S2, no murmur/rub/gallop; No lower extremity edema; Peripheral pulses are 2+ bilaterally  ABDOMEN: Nontender to palpation, normoactive bowel sounds, no rebound/guarding; No hepatosplenomegaly  MUSCULOSKELETAL:  Normal gait; no clubbing or cyanosis of digits; no joint swelling or tenderness to palpation  PSYCH: A+O to person, place, and time; affect appropriate  SKIN: No rashes; no palpable lesions    LABS:                        10.2   6.22  )-----------( 311      ( 26 Jul 2023 07:59 )             31.5     07-26    135  |  94<L>  |  7<L>  ----------------------------<  91  3.3<L>   |  29  |  0.8    Ca    8.2<L>      26 Jul 2023 07:59  Mg     1.9     07-26    TPro  6.5  /  Alb  2.6<L>  /  TBili  0.5  /  DBili  x   /  AST  23  /  ALT  10  /  AlkPhos  96  07-25    PT/INR - ( 25 Jul 2023 06:29 )   PT: 13.00 sec;   INR: 1.14 ratio         PTT - ( 25 Jul 2023 06:29 )  PTT:32.5 sec  CARDIAC MARKERS ( 25 Jul 2023 06:29 )  x     / <0.01 ng/mL / 35 U/L / x     / x      CARDIAC MARKERS ( 24 Jul 2023 19:58 )  x     / <0.01 ng/mL / x     / x     / x          Urinalysis Basic - ( 26 Jul 2023 07:59 )    Color: x / Appearance: x / SG: x / pH: x  Gluc: 91 mg/dL / Ketone: x  / Bili: x / Urobili: x   Blood: x / Protein: x / Nitrite: x   Leuk Esterase: x / RBC: x / WBC x   Sq Epi: x / Non Sq Epi: x / Bacteria: x          RADIOLOGY & ADDITIONAL TESTS:  Results Reviewed:   Imaging Personally Reviewed:  Electrocardiogram Personally Reviewed:    COORDINATION OF CARE:  Care Discussed with Consultants/Other Providers [Y/N]:  Prior or Outpatient Records Reviewed [Y/N]:

## 2023-07-26 NOTE — CONSULT NOTE ADULT - CONSULT REASON
Pleuritic chest pain, bilateral pleural effusion, pericardial effusion, Hx pf PMR  high HAIM titers
SOB.  Pleural effusions

## 2023-07-26 NOTE — PROGRESS NOTE ADULT - ASSESSMENT
68 y/o female with pmhx of HT, HL and anemia likely secondary to iron deficiency presented to ED with dyspnea on exertion, weakness, weight loss for the past six months and chest pain with BAILEY since 1 day prior to admission.      #pleuritic chest pain and BAILEY 2/2 to left sided minimal pleural effusion, unknown etiology  - pt on RA with an Jovanny >95%  - pleural effusion too minimal for a thoracocentesis  - pulm recs appreciated  - continue diuresis with lasix 40mg po  - etiology unclear  - ECG: chronic septal infarct with anterior ischemic changes with PACs and qtc of >470  - troponin neg x 3, elevated pro-BNP 1328  - HAIM elevated >1:150 so could be 2/2 to AI causes  - rheum recs apprecated  - TSH and FT4 wnl, CK wnl, RF neg, HIV neg  - QuantiFeron and lyme serology   - ordered ESR, CRP, ANCA panel, PRASANTH, Anti-CCP, anti-smith, anti-dsDNA, Sjogren's syndrome ab, anti-centromere ab, Scl-70 ab, anti-RNA polymerase III ab, anticardiolipin ab, beta-2 glycoprotein, lupus anticoagulant  - daily chest XRs  - intake and output  - fluid restriction    #unintentional weight loss of 45lbs due to anorexia and dysgeusia  - likely secondary to recent extended use of methotrexate for >6 months until 5/2023 for PMR  - CT of C/A/P negative for any cancer  - will get brain CT with contrast to complete any malignancy screening as mtx could cause a lymphoma  - likely will need a few more weeks to regain appetite post MTX use  - MTX stopped due to peripheral neuropathy; will obtain B12 and FA levels  - pt on B12 and FA; folinic acid not available    #normocytic anemia  - iron panel    #Polymyalgia Rheumatica   - follows op with Dr. Roseann Claros  - previously was on prednisolone until late 2022 and MTX until 5/2023  - rheum recs appreciated    #HTN  #HLD  - c/w home meds - metoprolol and hydralazine   - c/w lipitor 20mg     #GERD   -c/w pantoprazole      #prophylaxis  VTE: lovenox   GI: pantoprazole and miralax  ambulate as tolerated   DASH diet    68 y/o female with pmhx of HT, HL and anemia likely secondary to iron deficiency presented to ED with dyspnea on exertion, weakness, weight loss for the past six months and chest pain with BAILEY since 1 day prior to admission.      #pleuritic chest pain and BAILEY 2/2 to left sided minimal pleural effusion, unknown etiology  - pt on RA with an Jovanny >95%  - pleural effusion too minimal for a thoracocentesis  - pulm recs appreciated  - continue diuresis with lasix 40mg po  - etiology unclear  - ECG: chronic septal infarct with anterior ischemic changes with PACs and qtc of >470  - troponin neg x 3, elevated pro-BNP 1328  - HAIM elevated >1:150 so could be 2/2 to AI causes  - rheum recs apprecated  - TSH and FT4 wnl, CK wnl, RF neg, HIV neg  - QuantiFeron and lyme serology   - ordered ESR, CRP, ANCA panel, PRASANTH, Anti-CCP, anti-smith, anti-dsDNA, Sjogren's syndrome ab, anti-centromere ab, Scl-70 ab, anti-RNA polymerase III ab, anticardiolipin ab, beta-2 glycoprotein, lupus anticoagulant  - daily chest XRs  - intake and output  - fluid restriction    #unintentional weight loss of 45lbs due to anorexia and dysgeusia  - likely secondary to recent extended use of methotrexate for >6 months until 5/2023 for PMR  - CT of C/A/P negative for any cancer  - will get brain CT with contrast to complete any malignancy screening as mtx could cause a lymphoma  - likely will need a few more weeks to regain appetite post MTX use  - MTX stopped due to peripheral neuropathy; will obtain B12 and FA levels  - pt on B12 and FA; folinic acid not available  - Colonoscopy in 1/2023, endoscopy in 5/2023 were wnl  - Mammogram scheduled for 8/2023, and last year had manual breast exam; last mammo was wnl  - pap smear in 2022 was wnl  - smoked 1PPD until she quit in 1998; no exposure to secondhand smoke  - was in the [address] [place] [address] on 9/11, but immediately evacuated the building and the area with minimal prolonged exposure to on-site toxins  - traveled to [place] in the last one year   - Had COVID in 3/023 (did not require O2, steroids, or remdesivir) and is vaccinated x 2 plus 1 booster for COVID    #normocytic anemia  - iron panel sent    #Polymyalgia Rheumatica   - follows op with Dr. Roseann Claros  - previously was on prednisolone until late 2022 and MTX until 5/2023  - rheum recs appreciated  - strong FH of AI diseases    #HTN  #HLD  - c/w home meds - metoprolol and hydralazine   - c/w lipitor 20mg     #GERD   -c/w pantoprazole      #prophylaxis  VTE: lovenox   GI: pantoprazole and miralax  ambulate as tolerated   DASH diet

## 2023-07-26 NOTE — CONSULT NOTE ADULT - ASSESSMENT
This is a 66 y/o woman with PMH of tobacco use (quit in 1998), HTN, hyperlipidemia, MERRICK, and h/o PMR - was treated with methotrexate and steroids in the past presents with dyspnea on exertion, pleuritic right sided pain and wt loss. She was found to have b/l pleural effusions and small pericardial effusion on CT scan. Labs are significant for , ddimer 5K, elevated HAIM, neg RF, HIV neg. Rheumatology was consulted for elevated HAIM in the setting of B/L pleural effusions (left >right) and small pericardial effusion.     IMPRESSION/RECOMMENDATION      This is a 68 y/o woman with PMH of tobacco use (quit in 1998), HTN, hyperlipidemia, MERRICK, and h/o PMR diagnosed 04/2022- was treated with methotrexate and steroids in the past (last taken in 05/2023) presents with dyspnea on exertion, pleuritic right sided pain and wt loss. She was found to have b/l pleural effusions and small pericardial effusion on CT scan. Labs are significant for , ddimer 5K, elevated HIAM, neg RF, HIV neg. Rheumatology was consulted for elevated HAIM in the setting of B/L pleural effusions (left >right) and small pericardial effusion. Patient states she experiences b/l wrist pain and b/l PIP and MTP joint pain and stiffness intermittently. Stiffness lasts for about an Hr in the morning. Patient denies any other joint pain. Patient denies any skin rash or dry skin. Patient has Fhx of RA in her brother and grandmother.     IMPRESSION/RECOMMENDATION      This is a 68 y/o woman with PMH of tobacco use (quit in 1998), HTN, hyperlipidemia, MERRICK, and h/o PMR diagnosed 04/2022- was treated with methotrexate and steroids in the past (last taken in 05/2023) presents with dyspnea on exertion, pleuritic right sided pain and wt loss. She was found to have b/l pleural effusions and small pericardial effusion on CT scan. Labs are significant for , ddimer 5K, elevated HAIM, neg RF, HIV neg. Rheumatology was consulted for elevated HAIM in the setting of B/L pleural effusions (left >right) and small pericardial effusion. Patient states she experiences b/l wrist pain and b/l PIP and MTP joint pain and stiffness intermittently. Stiffness lasts for about an Hr in the morning. Patient denies any other joint pain. Patient denies any skin rash or dry skin. Patient has Fhx of RA in her brother and grandmother.     IMPRESSION/RECOMMENDATION   #B/L pleural effusions (left >right) and small pericardial effusion  - elevated HAIM, neg RF, HIV neg  - Please r/o infectious causes, send QuantiFeron and lyme serology   - please obtain ESR, CRP, ANCA, PRASANTH, CCP, DS DNA, sjogren's syndrome ab, anti-centromere ab, anti-SCL70 ab, anti-RNA polymerase III ab, anticardiolipin ab, beta-2 glycoprotein, lupus anticoagulant  - please obtain TSH and free T4  - If thoracentesis is done f/u on fluid analysis    This is a 68 y/o woman with PMH of tobacco use (quit in 1998), HTN, hyperlipidemia, MERRICK, and h/o PMR diagnosed 04/2022- was treated with methotrexate and steroids in the past (last taken in 05/2023) presents with dyspnea on exertion, pleuritic right sided pain and wt loss (45lb). She was found to have b/l pleural effusions and small pericardial effusion on CT scan. Labs are significant for , ddimer 5K, elevated HAIM, neg RF, HIV neg. Rheumatology was consulted for elevated HAIM in the setting of B/L pleural effusions (left >right) and small pericardial effusion. Patient states she experiences b/l wrist pain and b/l PIP and MTP joint pain and stiffness intermittently, currently no pain. Stiffness lasts for about an Hr in the morning. Patient denies any other joint pain. Patient denies any skin rash or dry skin. Patient denies any headache or visual changes. Patient has Fhx of RA in her brother and grandmother.     IMPRESSION/RECOMMENDATION   #B/L pleural effusions (left >right) and small pericardial effusion : r/o rheumatological disease (RA, SLE, in some cases PMR) vs infectious etiology   - elevated HAIM, neg RF, HIV neg  - Please obtain QuantiFeron and lyme serology   - please obtain ESR, CRP, ANCA panel, PRASANTH, Anti-CCP, anti-smith, anti-dsDNA, Sjogren's syndrome ab, anti-centromere ab, Scl-70 ab, anti-RNA polymerase III ab, anticardiolipin ab, beta-2 glycoprotein, lupus anticoagulant  - please obtain TSH and free T4  - If thoracentesis is done f/u on fluid analysis

## 2023-07-27 LAB
ANION GAP SERPL CALC-SCNC: 11 MMOL/L — SIGNIFICANT CHANGE UP (ref 7–14)
APTT BLD: 32 SEC — SIGNIFICANT CHANGE UP (ref 27–39.2)
BASOPHILS # BLD AUTO: 0.04 K/UL — SIGNIFICANT CHANGE UP (ref 0–0.2)
BASOPHILS NFR BLD AUTO: 0.9 % — SIGNIFICANT CHANGE UP (ref 0–1)
BUN SERPL-MCNC: 8 MG/DL — LOW (ref 10–20)
CALCIUM SERPL-MCNC: 8.6 MG/DL — SIGNIFICANT CHANGE UP (ref 8.4–10.4)
CHLORIDE SERPL-SCNC: 95 MMOL/L — LOW (ref 98–110)
CO2 SERPL-SCNC: 29 MMOL/L — SIGNIFICANT CHANGE UP (ref 17–32)
CREAT SERPL-MCNC: 1 MG/DL — SIGNIFICANT CHANGE UP (ref 0.7–1.5)
CRP SERPL-MCNC: 35 MG/L — HIGH
DRVVT RATIO: 0.99 RATIO — SIGNIFICANT CHANGE UP (ref 0–1.21)
DRVVT SCREEN TO CONFIRM RATIO: SIGNIFICANT CHANGE UP
EGFR: 61 ML/MIN/1.73M2 — SIGNIFICANT CHANGE UP
EOSINOPHIL # BLD AUTO: 0.23 K/UL — SIGNIFICANT CHANGE UP (ref 0–0.7)
EOSINOPHIL NFR BLD AUTO: 5 % — SIGNIFICANT CHANGE UP (ref 0–8)
ERYTHROCYTE [SEDIMENTATION RATE] IN BLOOD: 53 MM/HR — HIGH (ref 0–20)
FOLATE SERPL-MCNC: >20 NG/ML — SIGNIFICANT CHANGE UP
GLUCOSE BLDC GLUCOMTR-MCNC: 115 MG/DL — HIGH (ref 70–99)
GLUCOSE SERPL-MCNC: 84 MG/DL — SIGNIFICANT CHANGE UP (ref 70–99)
HCT VFR BLD CALC: 35.7 % — LOW (ref 37–47)
HGB BLD-MCNC: 11.3 G/DL — LOW (ref 12–16)
IMM GRANULOCYTES NFR BLD AUTO: 0.6 % — HIGH (ref 0.1–0.3)
IRON SATN MFR SERPL: 42 % — SIGNIFICANT CHANGE UP (ref 15–50)
IRON SATN MFR SERPL: 67 UG/DL — SIGNIFICANT CHANGE UP (ref 35–150)
LYMPHOCYTES # BLD AUTO: 1.32 K/UL — SIGNIFICANT CHANGE UP (ref 1.2–3.4)
LYMPHOCYTES # BLD AUTO: 28.5 % — SIGNIFICANT CHANGE UP (ref 20.5–51.1)
MCHC RBC-ENTMCNC: 26.7 PG — LOW (ref 27–31)
MCHC RBC-ENTMCNC: 31.7 G/DL — LOW (ref 32–37)
MCV RBC AUTO: 84.4 FL — SIGNIFICANT CHANGE UP (ref 81–99)
MONOCYTES # BLD AUTO: 0.3 K/UL — SIGNIFICANT CHANGE UP (ref 0.1–0.6)
MONOCYTES NFR BLD AUTO: 6.5 % — SIGNIFICANT CHANGE UP (ref 1.7–9.3)
NEUTROPHILS # BLD AUTO: 2.71 K/UL — SIGNIFICANT CHANGE UP (ref 1.4–6.5)
NEUTROPHILS NFR BLD AUTO: 58.5 % — SIGNIFICANT CHANGE UP (ref 42.2–75.2)
NORMALIZED SCT PPP-RTO: 1.08 RATIO — SIGNIFICANT CHANGE UP (ref 0–1.16)
NORMALIZED SCT PPP-RTO: SIGNIFICANT CHANGE UP
NRBC # BLD: 0 /100 WBCS — SIGNIFICANT CHANGE UP (ref 0–0)
PLATELET # BLD AUTO: 360 K/UL — SIGNIFICANT CHANGE UP (ref 130–400)
PMV BLD: 9.2 FL — SIGNIFICANT CHANGE UP (ref 7.4–10.4)
POTASSIUM SERPL-MCNC: 3.5 MMOL/L — SIGNIFICANT CHANGE UP (ref 3.5–5)
POTASSIUM SERPL-SCNC: 3.5 MMOL/L — SIGNIFICANT CHANGE UP (ref 3.5–5)
RBC # BLD: 4.23 M/UL — SIGNIFICANT CHANGE UP (ref 4.2–5.4)
RBC # FLD: 16 % — HIGH (ref 11.5–14.5)
SODIUM SERPL-SCNC: 135 MMOL/L — SIGNIFICANT CHANGE UP (ref 135–146)
TIBC SERPL-MCNC: 158 UG/DL — LOW (ref 220–430)
UIBC SERPL-MCNC: 91 UG/DL — LOW (ref 110–370)
VIT B12 SERPL-MCNC: 1991 PG/ML — HIGH (ref 232–1245)
WBC # BLD: 4.63 K/UL — LOW (ref 4.8–10.8)
WBC # FLD AUTO: 4.63 K/UL — LOW (ref 4.8–10.8)

## 2023-07-27 PROCEDURE — 70450 CT HEAD/BRAIN W/O DYE: CPT | Mod: 26

## 2023-07-27 PROCEDURE — 71045 X-RAY EXAM CHEST 1 VIEW: CPT | Mod: 26

## 2023-07-27 PROCEDURE — 99232 SBSQ HOSP IP/OBS MODERATE 35: CPT

## 2023-07-27 RX ORDER — POTASSIUM CHLORIDE 20 MEQ
20 PACKET (EA) ORAL
Refills: 0 | Status: COMPLETED | OUTPATIENT
Start: 2023-07-27 | End: 2023-07-27

## 2023-07-27 RX ADMIN — Medication 20 MILLIEQUIVALENT(S): at 13:30

## 2023-07-27 RX ADMIN — PANTOPRAZOLE SODIUM 40 MILLIGRAM(S): 20 TABLET, DELAYED RELEASE ORAL at 05:56

## 2023-07-27 RX ADMIN — Medication 40 MILLIGRAM(S): at 05:33

## 2023-07-27 RX ADMIN — Medication 100 MILLIGRAM(S): at 05:33

## 2023-07-27 RX ADMIN — Medication 1 MILLIGRAM(S): at 11:25

## 2023-07-27 RX ADMIN — Medication 100 MILLIGRAM(S): at 17:09

## 2023-07-27 RX ADMIN — ATORVASTATIN CALCIUM 20 MILLIGRAM(S): 80 TABLET, FILM COATED ORAL at 22:14

## 2023-07-27 RX ADMIN — ENOXAPARIN SODIUM 40 MILLIGRAM(S): 100 INJECTION SUBCUTANEOUS at 17:09

## 2023-07-27 RX ADMIN — Medication 20 MILLIEQUIVALENT(S): at 15:01

## 2023-07-27 RX ADMIN — PREGABALIN 1000 MICROGRAM(S): 225 CAPSULE ORAL at 11:26

## 2023-07-27 NOTE — PATIENT PROFILE ADULT - FALL HARM RISK - UNIVERSAL INTERVENTIONS
Bed in lowest position, wheels locked, appropriate side rails in place/Call bell, personal items and telephone in reach/Instruct patient to call for assistance before getting out of bed or chair/Non-slip footwear when patient is out of bed/[place] to call system/Physically safe environment - no spills, clutter or unnecessary equipment/Purposeful Proactive Rounding/Room/bathroom lighting operational, light cord in reach

## 2023-07-27 NOTE — PATIENT PROFILE ADULT - NSPROPTRIGHTNOTIFY_GEN_A_NUR
General Surgery Office Note  [facility]  Consandre P. Jose Jay MD    Patient's Name/Date of Birth: Jesus Clancy / 1969    Date: June 16, 2020     Surgeon: Jose Jay MD    Chief Complaint:   Chief Complaint   Patient presents with   [facility]     Abdominal pain RUQ radiating to back       Patient Active Problem List   Diagnosis    Incisional hernia, without obstruction or gangrene       Subjective: Patient complains of abdominal pain on the right upper quadrant of the abdomen that radiates to her right back. She reports that this pain is been present since before her hernia repair surgery back in February 2020. She denies any association with p.o. intake. She has had prior cholecystectomy and more recently has had umbilical and bilateral inguinal hernia repair with mesh. She does report chronic constipation but denies any episode of diarrhea. She does have 1 bowel movement a day. Objective:  /82 (Site: Right Upper Arm, Position: Sitting, Cuff Size: Medium Adult)   Pulse 71   Temp 97.5 °F (36.4 °C) (Temporal)   Ht 5' 7\" (1.702 m)   Wt 200 lb (90.7 kg)   LMP 10/01/2017   BMI 31.32 kg/m²   Labs:  No results for input(s): WBC, HGB, HCT in the last 72 hours. Invalid input(s): PLR  Lab Results   Component Value Date    CREATININE 0.8 02/27/2020    BUN 19 02/27/2020     02/27/2020    K 5.4 (H) 02/27/2020     02/27/2020    CO2 28 02/27/2020     No results for input(s): LIPASE, AMYLASE in the last 72 hours.     General appearance: AA, NAD  HEENT: NCAT, PERRLA, EOMI  Lungs: Clear, equal rise bilateral  Heart: Reg  Abdomen: soft, nondistended, nontender, incisions well healed, no recurrent umbilical hernia, no inguinal bulge or recurrent inguinal hernia  Skin: No lesions, incisions well healed  Psych: No distress, conversive, no hallucinations  : Deferred  Rectal: Deferred    A complete 10 system review was performed and are otherwise negative unless mentioned declines

## 2023-07-27 NOTE — RAPID RESPONSE TEAM SUMMARY - NSSITUATIONBACKGROUNDRRT_GEN_ALL_CORE
Pt was with health aide being cleaned in the bathroom when suddenly she felt dizzy and fell to her knees and became unconscious for approximately 10-15 seconds. Pt regained consciousness and had full recollection of the events, did not sustain injury to head, did not bite tongue, have urinary/fecal incontinence, or demonstrate any shaking of extremities. BP was 160/80, HR sinus 70's, ekg showed no abnormalities, sugar was 110, pt did not have any neurologic deficits on physical exam, lungs CTABL, HR reg w/o murmurs.      Suspect orthostatic vs vasovagal syncope.    Plan  - order EEG, CTH, and orthostatic bp

## 2023-07-27 NOTE — PATIENT PROFILE ADULT - NSPROIMPLANTSMEDDEV_GEN_A_NUR
None Hemigard Intro: Due to skin fragility and wound tension, it was decided to use HEMIGARD adhesive retention suture devices to permit a linear closure. The skin was cleaned and dried for a 6cm distance away from the wound. Excessive hair, if present, was removed to allow for adhesion.

## 2023-07-27 NOTE — PROGRESS NOTE ADULT - SUBJECTIVE AND OBJECTIVE BOX
Patient is a 67y old  Female who presents with a chief complaint of SOB and weakness (26 Jul 2023 13:42)      SUBJECTIVE / OVERNIGHT EVENTS:   ADDITIONAL REVIEW OF SYSTEMS: as above    MEDICATIONS  (STANDING):  atorvastatin 20 milliGRAM(s) Oral at bedtime  cyanocobalamin 1000 MICROGram(s) Oral daily  enoxaparin Injectable 40 milliGRAM(s) SubCutaneous every 24 hours  folic acid 1 milliGRAM(s) Oral daily  furosemide    Tablet 40 milliGRAM(s) Oral daily  metoprolol tartrate 100 milliGRAM(s) Oral two times a day  pantoprazole    Tablet 40 milliGRAM(s) Oral before breakfast    MEDICATIONS  (PRN):      CAPILLARY BLOOD GLUCOSE        I&O's Summary      PHYSICAL EXAM:  Vital Signs Last 24 Hrs  T(C): 36.6 (26 Jul 2023 15:55), Max: 36.9 (26 Jul 2023 05:49)  T(F): 97.9 (26 Jul 2023 15:55), Max: 98.5 (26 Jul 2023 05:49)  HR: 75 (26 Jul 2023 15:55) (67 - 77)  BP: 127/57 (26 Jul 2023 15:55) (105/53 - 127/59)  BP(mean): 83 (26 Jul 2023 00:05) (83 - 83)  RR: 18 (26 Jul 2023 15:55) (18 - 18)  SpO2: 95% (26 Jul 2023 15:55) (95% - 97%)    Parameters below as of 26 Jul 2023 15:55  Patient On (Oxygen Delivery Method): room air      CONSTITUTIONAL: NAD, normal weight  ENMT: Moist oral mucosa, no pharyngeal injection or exudates; normal dentition  NECK: Supple, no palpable masses; no thyromegaly  RESPIRATORY: Normal respiratory effort; decreased breath sounds in left lower lobe  CARDIOVASCULAR: Regular rate and rhythm, normal S1 and S2, no murmur/rub/gallop; No lower extremity edema; Peripheral pulses are 2+ bilaterally  ABDOMEN: Nontender to palpation, normoactive bowel sounds, no rebound/guarding; No hepatosplenomegaly  MUSCULOSKELETAL:  Normal gait; no clubbing or cyanosis of digits; no joint swelling or tenderness to palpation  PSYCH: A+O to person, place, and time; affect appropriate  SKIN: No rashes; no palpable lesions    LABS:                        10.2   6.22  )-----------( 311      ( 26 Jul 2023 07:59 )             31.5     07-26    135  |  94<L>  |  7<L>  ----------------------------<  91  3.3<L>   |  29  |  0.8    Ca    8.2<L>      26 Jul 2023 07:59  Mg     1.9     07-26    TPro  6.5  /  Alb  2.6<L>  /  TBili  0.5  /  DBili  x   /  AST  23  /  ALT  10  /  AlkPhos  96  07-25    PT/INR - ( 25 Jul 2023 06:29 )   PT: 13.00 sec;   INR: 1.14 ratio         PTT - ( 25 Jul 2023 06:29 )  PTT:32.5 sec  CARDIAC MARKERS ( 25 Jul 2023 06:29 )  x     / <0.01 ng/mL / 35 U/L / x     / x      CARDIAC MARKERS ( 24 Jul 2023 19:58 )  x     / <0.01 ng/mL / x     / x     / x          Urinalysis Basic - ( 26 Jul 2023 07:59 )    Color: x / Appearance: x / SG: x / pH: x  Gluc: 91 mg/dL / Ketone: x  / Bili: x / Urobili: x   Blood: x / Protein: x / Nitrite: x   Leuk Esterase: x / RBC: x / WBC x   Sq Epi: x / Non Sq Epi: x / Bacteria: x          RADIOLOGY & ADDITIONAL TESTS:  Results Reviewed:   Imaging Personally Reviewed:  Electrocardiogram Personally Reviewed:    COORDINATION OF CARE:  Care Discussed with Consultants/Other Providers [Y/N]:  Prior or Outpatient Records Reviewed [Y/N]:         Patient is a 67y old  Female who presents with a chief complaint of SOB and weakness (26 Jul 2023 13:42)      SUBJECTIVE / OVERNIGHT EVENTS: Pt is doing well. Still on RA and has no complaints. Partner bedside and updated. Peeing quite a bit.   ADDITIONAL REVIEW OF SYSTEMS: as above    MEDICATIONS  (STANDING):  atorvastatin 20 milliGRAM(s) Oral at bedtime  cyanocobalamin 1000 MICROGram(s) Oral daily  enoxaparin Injectable 40 milliGRAM(s) SubCutaneous every 24 hours  folic acid 1 milliGRAM(s) Oral daily  furosemide    Tablet 40 milliGRAM(s) Oral daily  metoprolol tartrate 100 milliGRAM(s) Oral two times a day  pantoprazole    Tablet 40 milliGRAM(s) Oral before breakfast    MEDICATIONS  (PRN):      CAPILLARY BLOOD GLUCOSE        I&O's Summary      PHYSICAL EXAM:  Vital Signs Last 24 Hrs  T(C): 36.6 (26 Jul 2023 15:55), Max: 36.9 (26 Jul 2023 05:49)  T(F): 97.9 (26 Jul 2023 15:55), Max: 98.5 (26 Jul 2023 05:49)  HR: 75 (26 Jul 2023 15:55) (67 - 77)  BP: 127/57 (26 Jul 2023 15:55) (105/53 - 127/59)  BP(mean): 83 (26 Jul 2023 00:05) (83 - 83)  RR: 18 (26 Jul 2023 15:55) (18 - 18)  SpO2: 95% (26 Jul 2023 15:55) (95% - 97%)    Parameters below as of 26 Jul 2023 15:55  Patient On (Oxygen Delivery Method): room air      CONSTITUTIONAL: NAD, normal weight  ENMT: Moist oral mucosa, no pharyngeal injection or exudates; normal dentition  NECK: Supple, no palpable masses; no thyromegaly  RESPIRATORY: Normal respiratory effort; decreased breath sounds in left lower lobe  CARDIOVASCULAR: Regular rate and rhythm, normal S1 and S2, no murmur/rub/gallop; No lower extremity edema; Peripheral pulses are 2+ bilaterally  ABDOMEN: Nontender to palpation, normoactive bowel sounds, no rebound/guarding; No hepatosplenomegaly  MUSCULOSKELETAL:  Normal gait; no clubbing or cyanosis of digits; no joint swelling or tenderness to palpation  PSYCH: A+O to person, place, and time; affect appropriate  SKIN: No rashes; no palpable lesions    LABS:                        10.2   6.22  )-----------( 311      ( 26 Jul 2023 07:59 )             31.5     07-26    135  |  94<L>  |  7<L>  ----------------------------<  91  3.3<L>   |  29  |  0.8    Ca    8.2<L>      26 Jul 2023 07:59  Mg     1.9     07-26    TPro  6.5  /  Alb  2.6<L>  /  TBili  0.5  /  DBili  x   /  AST  23  /  ALT  10  /  AlkPhos  96  07-25    PT/INR - ( 25 Jul 2023 06:29 )   PT: 13.00 sec;   INR: 1.14 ratio         PTT - ( 25 Jul 2023 06:29 )  PTT:32.5 sec  CARDIAC MARKERS ( 25 Jul 2023 06:29 )  x     / <0.01 ng/mL / 35 U/L / x     / x      CARDIAC MARKERS ( 24 Jul 2023 19:58 )  x     / <0.01 ng/mL / x     / x     / x          Urinalysis Basic - ( 26 Jul 2023 07:59 )    Color: x / Appearance: x / SG: x / pH: x  Gluc: 91 mg/dL / Ketone: x  / Bili: x / Urobili: x   Blood: x / Protein: x / Nitrite: x   Leuk Esterase: x / RBC: x / WBC x   Sq Epi: x / Non Sq Epi: x / Bacteria: x          RADIOLOGY & ADDITIONAL TESTS:  Results Reviewed:   Imaging Personally Reviewed:  Electrocardiogram Personally Reviewed:    COORDINATION OF CARE:  Care Discussed with Consultants/Other Providers [Y/N]:  Prior or Outpatient Records Reviewed [Y/N]:

## 2023-07-27 NOTE — PROGRESS NOTE ADULT - ASSESSMENT
66 y/o female with pmhx of HT, HL and anemia likely secondary to iron deficiency presented to ED with dyspnea on exertion, weakness, weight loss for the past six months and chest pain with BAILEY since 1 day prior to admission.      #pleuritic chest pain and BAILEY 2/2 to left sided minimal pleural effusion, unknown etiology  - pt on RA with an KYUNG >95%  - pleural effusion too minimal for a thoracocentesis  - pulm recs appreciated  - continue diuresis with lasix 40mg po  - etiology unclear  - ECG: chronic septal infarct with anterior ischemic changes with PACs and qtc of >470  - troponin neg x 3, elevated pro-BNP 1328  - HAIM elevated >1:250 so could be 2/2 to AI causes  - rheum recs appreciated  - TSH and FT4 wnl, CK wnl, RF neg, HIV neg, CRP and ESR elevated  - QuantiFeron and lyme serology   - sent and pending: ANCA panel, PRASANTH, Anti-CCP, anti-smith, anti-dsDNA, Sjogren's syndrome ab, anti-centromere ab, Scl-70 ab, anti-RNA polymerase III ab, anticardiolipin ab, beta-2 glycoprotein, lupus anticoagulant  - daily chest XRs: minimal change in the left pleural effusion  - intake and output  - fluid restriction    #unintentional weight loss of 45lbs due to anorexia and dysgeusia  - likely secondary to recent extended use of methotrexate for >6 months until 5/2023 for PMR  - CT of C/A/P negative for any cancer  - less likely concerning for malignancy   - likely will need a few more weeks to regain appetite post MTX use  - MTX stopped due to peripheral neuropathy; will obtain B12 and FA levels  - pt on B12 and FA; folinic acid not available  - Colonoscopy in 1/2023, endoscopy in 5/2023 were wnl  - Mammogram scheduled for 8/2023, and last year had manual breast exam; last mammo was wnl  - pap smear in 2022 was wnl  - smoked 1PPD until she quit in 1998; no exposure to secondhand smoke  - was in the [address] [place] [address] on 9/11, but immediately evacuated the building and the area with minimal prolonged exposure to on-site toxins  - traveled to [place] in the last one year   - Had COVID in 3/023 (did not require O2, steroids, or remdesivir) and is vaccinated x 2 plus 1 booster for COVID    #normocytic anemia  - iron panel sent    #Polymyalgia Rheumatica   - follows op with Dr. Roseann Claros  - previously was on prednisolone until late 2022 and MTX until 5/2023  - rheum recs appreciated  - strong FH of AI diseases    #HTN  #HLD  - c/w home meds - metoprolol and hydralazine   - c/w lipitor 20mg     #GERD   -c/w pantoprazole      #prophylaxis  VTE: lovenox   GI: pantoprazole and miralax  ambulate as tolerated   DASH diet    66 y/o female with pmhx of HT, HL and anemia likely secondary to iron deficiency presented to ED with dyspnea on exertion, weakness, weight loss for the past six months and chest pain with BAILEY since 1 day prior to admission.      #pleuritic chest pain and BAILEY 2/2 to left sided minimal pleural effusion, unknown etiology  - pt on RA with an KYUNG >95%  - pleural effusion too minimal for a thoracocentesis  - pulm recs appreciated  - continue diuresis with lasix 40mg po  - etiology unclear  - ECG: chronic septal infarct with anterior ischemic changes with PACs and qtc of >470  - troponin neg x 3, elevated pro-BNP 1328  - HAIM elevated >1:250 so could be 2/2 to AI causes  - rheum recs appreciated  - TSH and FT4 wnl, CK wnl, RF neg, HIV neg, CRP and ESR elevated  - QuantiFeron and lyme serology   - sent and pending: ANCA panel, PRASANTH, Anti-CCP, anti-smith, anti-dsDNA, Sjogren's syndrome ab, anti-centromere ab, Scl-70 ab, anti-RNA polymerase III ab, anticardiolipin ab, beta-2 glycoprotein, lupus anticoagulant  - daily chest XRs: minimal change in the left pleural effusion  - intake and output  - fluid restriction    #unintentional weight loss of 45lbs due to anorexia and dysgeusia  - likely secondary to recent extended use of methotrexate for >6 months until 5/2023 for PMR  - CT of C/A/P negative for any cancer  - less likely concerning for malignancy   - likely will need a few more weeks to regain appetite post MTX use  - MTX stopped due to peripheral neuropathy; will obtain B12 and FA levels  - pt on B12 and FA; folinic acid not available  - Colonoscopy in 1/2023, endoscopy in 5/2023 were wnl  - Mammogram scheduled for 8/2023, and last year had manual breast exam; last mammo was wnl  - pap smear in 2022 was wnl  - smoked 1PPD until she quit in 1998; no exposure to secondhand smoke  - was in the [address] [place] [address] on 9/11, but immediately evacuated the building and the area with minimal prolonged exposure to on-site toxins  - traveled to [place] in the last one year   - Had COVID in 3/023 (did not require O2, steroids, or remdesivir) and is vaccinated x 2 plus 1 booster for COVID    #normocytic anemia  - iron panel sent    #Polymyalgia Rheumatica   - follows op with Dr. Roseann Claros  - previously was on prednisolone until late 2022 and MTX until 5/2023 as per OP notes  - rheum recs appreciated  - strong FH of AI diseases    #HTN  #HLD  - c/w home meds - metoprolol and hydralazine   - c/w lipitor 20mg     #GERD   -c/w pantoprazole      #prophylaxis  VTE: lovenox   GI: pantoprazole and miralax  ambulate as tolerated   DASH diet    66 y/o female with pmhx of HT, HL and anemia likely secondary to iron deficiency presented to ED with dyspnea on exertion, weakness, weight loss for the past six months and chest pain with BAILEY since 1 day prior to admission.      #pleuritic chest pain and BAILEY 2/2 to left sided minimal pleural effusion, unknown etiology  - pt on RA with an KYUNG >95%  - pleural effusion too minimal for a thoracocentesis  - pulm recs appreciated  - continue diuresis with lasix 40mg po  - etiology unclear  - ECG: chronic septal infarct with anterior ischemic changes with PACs and qtc of >470  - troponin neg x 3, elevated pro-BNP 1328  - HAIM elevated >1:250 so could be 2/2 to AI causes  - rheum recs appreciated  - TSH and FT4 wnl, CK wnl, RF neg, HIV neg, CRP and ESR elevated  - QuantiFeron and lyme serology   - sent and pending: ANCA panel, PRASANTH, Anti-CCP, anti-smith, anti-dsDNA, Sjogren's syndrome ab, anti-centromere ab, Scl-70 ab, anti-RNA polymerase III ab, anticardiolipin ab, beta-2 glycoprotein, lupus anticoagulant  - daily chest XRs: minimal change in the left pleural effusion  - intake and output  - fluid restriction    #unintentional weight loss of 45lbs due to anorexia and dysgeusia  - likely secondary to recent extended use of methotrexate for >6 months until 5/2023 for PMR  - CT of C/A/P negative for any cancer  - less likely concerning for malignancy   - likely will need a few more weeks to regain appetite post MTX use  - MTX stopped due to peripheral neuropathy; will obtain B12 and FA levels  - pt on B12 and FA; folinic acid not available  - Colonoscopy in 1/2023, endoscopy in 5/2023 were wnl  - Mammogram scheduled for 8/2023, and last year had manual breast exam; last mammo was wnl  - pap smear in 2022 was wnl  - smoked 1PPD until she quit in 1998; no exposure to secondhand smoke  - was in the [address] [place] [address] on 9/11, but immediately evacuated the building and the area with minimal prolonged exposure to on-site toxins  - traveled to [place] in the last one year   - Had COVID in 3/023 (did not require O2, steroids, or remdesivir) and is vaccinated x 2 plus 1 booster for COVID  - b/l LE duplex negative    #normocytic anemia  - iron panel sent    #Polymyalgia Rheumatica   - follows op with Dr. Roseann Claros  - previously was on prednisolone until late 2022 and MTX until 5/2023 as per OP notes  - rheum recs appreciated  - strong FH of AI diseases    #HTN  #HLD  - c/w home meds - metoprolol and hydralazine   - c/w lipitor 20mg     #GERD   -c/w pantoprazole      #prophylaxis  VTE: lovenox   GI: pantoprazole and miralax  ambulate as tolerated   DASH diet

## 2023-07-28 LAB
ALBUMIN SERPL ELPH-MCNC: 2.7 G/DL — LOW (ref 3.5–5.2)
ALP SERPL-CCNC: 80 U/L — SIGNIFICANT CHANGE UP (ref 30–115)
ALT FLD-CCNC: 8 U/L — SIGNIFICANT CHANGE UP (ref 0–41)
ANION GAP SERPL CALC-SCNC: 11 MMOL/L — SIGNIFICANT CHANGE UP (ref 7–14)
AST SERPL-CCNC: 16 U/L — SIGNIFICANT CHANGE UP (ref 0–41)
B BURGDOR C6 AB SER-ACNC: NEGATIVE — SIGNIFICANT CHANGE UP
B BURGDOR IGG+IGM SER-ACNC: 0.59 INDEX — SIGNIFICANT CHANGE UP (ref 0.01–0.89)
B2 GLYCOPROT1 AB SER QL: NEGATIVE — SIGNIFICANT CHANGE UP
BASOPHILS # BLD AUTO: 0.03 K/UL — SIGNIFICANT CHANGE UP (ref 0–0.2)
BASOPHILS NFR BLD AUTO: 0.5 % — SIGNIFICANT CHANGE UP (ref 0–1)
BILIRUB SERPL-MCNC: 0.4 MG/DL — SIGNIFICANT CHANGE UP (ref 0.2–1.2)
BUN SERPL-MCNC: 10 MG/DL — SIGNIFICANT CHANGE UP (ref 10–20)
CALCIUM SERPL-MCNC: 8.7 MG/DL — SIGNIFICANT CHANGE UP (ref 8.4–10.5)
CARDIOLIPIN AB SER-ACNC: POSITIVE
CARDIOLIPIN IGM SER-MCNC: 10.1 GPL — SIGNIFICANT CHANGE UP (ref 0–12.5)
CARDIOLIPIN IGM SER-MCNC: 20.5 MPL — HIGH (ref 0–12.5)
CCP IGG SERPL-ACNC: 11 UNITS — SIGNIFICANT CHANGE UP
CHLORIDE SERPL-SCNC: 99 MMOL/L — SIGNIFICANT CHANGE UP (ref 98–110)
CO2 SERPL-SCNC: 29 MMOL/L — SIGNIFICANT CHANGE UP (ref 17–32)
CREAT SERPL-MCNC: 0.9 MG/DL — SIGNIFICANT CHANGE UP (ref 0.7–1.5)
DEPRECATED CARDIOLIPIN IGA SER: 12.6 APL — HIGH (ref 0–12.5)
DSDNA AB SER-ACNC: 22 IU/ML — SIGNIFICANT CHANGE UP
EGFR: 69 ML/MIN/1.73M2 — SIGNIFICANT CHANGE UP
EOSINOPHIL # BLD AUTO: 0.2 K/UL — SIGNIFICANT CHANGE UP (ref 0–0.7)
EOSINOPHIL NFR BLD AUTO: 3.6 % — SIGNIFICANT CHANGE UP (ref 0–8)
GLUCOSE SERPL-MCNC: 88 MG/DL — SIGNIFICANT CHANGE UP (ref 70–99)
HCT VFR BLD CALC: 32.9 % — LOW (ref 37–47)
HGB BLD-MCNC: 10.5 G/DL — LOW (ref 12–16)
IMM GRANULOCYTES NFR BLD AUTO: 0.9 % — HIGH (ref 0.1–0.3)
LYMPHOCYTES # BLD AUTO: 1.62 K/UL — SIGNIFICANT CHANGE UP (ref 1.2–3.4)
LYMPHOCYTES # BLD AUTO: 29 % — SIGNIFICANT CHANGE UP (ref 20.5–51.1)
MCHC RBC-ENTMCNC: 27.1 PG — SIGNIFICANT CHANGE UP (ref 27–31)
MCHC RBC-ENTMCNC: 31.9 G/DL — LOW (ref 32–37)
MCV RBC AUTO: 84.8 FL — SIGNIFICANT CHANGE UP (ref 81–99)
MONOCYTES # BLD AUTO: 0.36 K/UL — SIGNIFICANT CHANGE UP (ref 0.1–0.6)
MONOCYTES NFR BLD AUTO: 6.5 % — SIGNIFICANT CHANGE UP (ref 1.7–9.3)
NEUTROPHILS # BLD AUTO: 3.32 K/UL — SIGNIFICANT CHANGE UP (ref 1.4–6.5)
NEUTROPHILS NFR BLD AUTO: 59.5 % — SIGNIFICANT CHANGE UP (ref 42.2–75.2)
NRBC # BLD: 0 /100 WBCS — SIGNIFICANT CHANGE UP (ref 0–0)
PLATELET # BLD AUTO: 312 K/UL — SIGNIFICANT CHANGE UP (ref 130–400)
PMV BLD: 9.4 FL — SIGNIFICANT CHANGE UP (ref 7.4–10.4)
POTASSIUM SERPL-MCNC: 3.8 MMOL/L — SIGNIFICANT CHANGE UP (ref 3.5–5)
POTASSIUM SERPL-SCNC: 3.8 MMOL/L — SIGNIFICANT CHANGE UP (ref 3.5–5)
PROCALCITONIN SERPL-MCNC: 0.09 NG/ML — SIGNIFICANT CHANGE UP (ref 0.02–0.1)
PROT SERPL-MCNC: 6.1 G/DL — SIGNIFICANT CHANGE UP (ref 6–8)
RBC # BLD: 3.88 M/UL — LOW (ref 4.2–5.4)
RBC # FLD: 15.9 % — HIGH (ref 11.5–14.5)
RF+CCP IGG SER-IMP: NEGATIVE — SIGNIFICANT CHANGE UP
SODIUM SERPL-SCNC: 139 MMOL/L — SIGNIFICANT CHANGE UP (ref 135–146)
WBC # BLD: 5.58 K/UL — SIGNIFICANT CHANGE UP (ref 4.8–10.8)
WBC # FLD AUTO: 5.58 K/UL — SIGNIFICANT CHANGE UP (ref 4.8–10.8)

## 2023-07-28 PROCEDURE — 99233 SBSQ HOSP IP/OBS HIGH 50: CPT

## 2023-07-28 PROCEDURE — 71045 X-RAY EXAM CHEST 1 VIEW: CPT | Mod: 26

## 2023-07-28 RX ADMIN — Medication 100 MILLIGRAM(S): at 06:04

## 2023-07-28 RX ADMIN — Medication 1 MILLIGRAM(S): at 12:27

## 2023-07-28 RX ADMIN — Medication 100 MILLIGRAM(S): at 17:32

## 2023-07-28 RX ADMIN — Medication 40 MILLIGRAM(S): at 06:04

## 2023-07-28 RX ADMIN — PANTOPRAZOLE SODIUM 40 MILLIGRAM(S): 20 TABLET, DELAYED RELEASE ORAL at 06:05

## 2023-07-28 RX ADMIN — PREGABALIN 1000 MICROGRAM(S): 225 CAPSULE ORAL at 12:27

## 2023-07-28 RX ADMIN — ATORVASTATIN CALCIUM 20 MILLIGRAM(S): 80 TABLET, FILM COATED ORAL at 21:14

## 2023-07-28 RX ADMIN — POLYETHYLENE GLYCOL 3350 17 GRAM(S): 17 POWDER, FOR SOLUTION ORAL at 12:27

## 2023-07-28 RX ADMIN — CHLORHEXIDINE GLUCONATE 1 APPLICATION(S): 213 SOLUTION TOPICAL at 06:03

## 2023-07-28 RX ADMIN — ENOXAPARIN SODIUM 40 MILLIGRAM(S): 100 INJECTION SUBCUTANEOUS at 17:32

## 2023-07-28 NOTE — PROGRESS NOTE ADULT - SUBJECTIVE AND OBJECTIVE BOX
CHIEF COMPLAINT:    Patient is a 67y old  Female who presents with a chief complaint of SOB and weakness     INTERVAL HPI/OVERNIGHT EVENTS:    Patient seen and examined at bedside. Pt syncopized overnight walking to rest room    ROS: Denies SOB, chest pain. All other systems are negative.    Medications:  Standing  atorvastatin 20 milliGRAM(s) Oral at bedtime  chlorhexidine 2% Cloths 1 Application(s) Topical <User Schedule>  cyanocobalamin 1000 MICROGram(s) Oral daily  enoxaparin Injectable 40 milliGRAM(s) SubCutaneous every 24 hours  folic acid 1 milliGRAM(s) Oral daily  metoprolol tartrate 100 milliGRAM(s) Oral two times a day  pantoprazole    Tablet 40 milliGRAM(s) Oral before breakfast  polyethylene glycol 3350 17 Gram(s) Oral daily    PRN Meds        Vital Signs:    T(F): 98.2 (07-28-23 @ 13:12), Max: 98.2 (07-28-23 @ 13:12)  HR: 73 (07-28-23 @ 13:12) (73 - 81)  BP: 170/69 (07-28-23 @ 05:18) (146/67 - 170/69)  RR: 18 (07-28-23 @ 05:18) (18 - 18)  SpO2: 97% (07-27-23 @ 20:07) (97% - 97%)  I&O's Summary    27 Jul 2023 07:01  -  28 Jul 2023 07:00  --------------------------------------------------------  IN: 401 mL / OUT: 701 mL / NET: -300 mL    28 Jul 2023 07:01  -  28 Jul 2023 13:29  --------------------------------------------------------  IN: 266 mL / OUT: 300 mL / NET: -34 mL    POCT Blood Glucose.: 115 mg/dL (27 Jul 2023 19:50)      PHYSICAL EXAM:  GENERAL:  NAD  SKIN: No rashes or lesions  HEENT: Atraumatic. Normocephalic. Anicteric  NECK:  No JVD.   PULMONARY: Clear to ausculation bilaterally. No wheezing. No rales  CVS: Normal S1, S2. Regular rate and rhythm. No murmurs.  ABDOMEN/GI: Soft, Nontender, Nondistended; Bowel sounds are present  EXTREMITIES:  No edema B/L LE.  NEUROLOGIC:  No motor deficit.  PSYCH: Alert & oriented x 3, normal affect      LABS:                        10.5   5.58  )-----------( 312      ( 28 Jul 2023 06:55 )             32.9     07-28    139  |  99  |  10  ----------------------------<  88  3.8   |  29  |  0.9    Ca    8.7      28 Jul 2023 06:55    TPro  6.1  /  Alb  2.7<L>  /  TBili  0.4  /  DBili  x   /  AST  16  /  ALT  8   /  AlkPhos  80  07-28    PTT - ( 27 Jul 2023 06:24 )  PTT:32.0 sec          RADIOLOGY & ADDITIONAL TESTS:  Imaging or report Personally Reviewed:  [ ] YES  [ ] NO -->no new images    Telemetry reviewed independently - NSR, no acute events  EKG reviewed independently -->no new EKGs    Consultant(s) Notes Reviewed:  [ ] YES  [ ] NO  Care Discussed with Consultants/Other Providers [ ] YES  [ ] NO    Case discussed with resident  Care discussed with pt

## 2023-07-28 NOTE — PROGRESS NOTE ADULT - SUBJECTIVE AND OBJECTIVE BOX
24H events:    Patient is a 67y old Female who presents with a chief complaint of SOB and weakness (27 Jul 2023 13:11)    Primary diagnosis of Pleural effusion       Today is hospital day 4d. This morning patient was seen and examined at bedside, resting comfortably in bed.  pt denied presenting with chest pain. pt reported currently no sob, nausea, diarrhea, constipation.    PAST MEDICAL & SURGICAL HISTORY  Hypertension    Hyperlipidemia    S/P cholecystectomy      SOCIAL HISTORY:  Social History:      ALLERGIES:  codeine (Hives)    MEDICATIONS:  STANDING MEDICATIONS  atorvastatin 20 milliGRAM(s) Oral at bedtime  chlorhexidine 2% Cloths 1 Application(s) Topical <User Schedule>  cyanocobalamin 1000 MICROGram(s) Oral daily  enoxaparin Injectable 40 milliGRAM(s) SubCutaneous every 24 hours  folic acid 1 milliGRAM(s) Oral daily  metoprolol tartrate 100 milliGRAM(s) Oral two times a day  pantoprazole    Tablet 40 milliGRAM(s) Oral before breakfast  polyethylene glycol 3350 17 Gram(s) Oral daily    PRN MEDICATIONS    VITALS:   T(F): 97.3  HR: 73  BP: 170/69  RR: 18  SpO2: 97%    PHYSICAL EXAM:  GENERAL: NAD, well-groomed, well-developed  HEAD:  Atraumatic, Normocephalic  EYES: EOMI  NECK: Supple  NERVOUS SYSTEM:  Alert & Oriented X3, non focal   CHEST/LUNG: Clear to auscultation bilaterally; No rales, rhonchi, wheezing, or rubs  HEART: Regular rate and rhythm; No murmurs, rubs, or gallops  ABDOMEN: Soft, Nontender, Nondistended; Bowel sounds present  EXTREMITIES:  2+ Peripheral Pulses, No clubbing, cyanosis, or edema  LYMPH: No lymphadenopathy noted  SKIN: No rashes or lesions  LABS:                        10.5   5.58  )-----------( 312      ( 28 Jul 2023 06:55 )             32.9     07-28    139  |  99  |  10  ----------------------------<  88  3.8   |  29  |  0.9    Ca    8.7      28 Jul 2023 06:55    TPro  6.1  /  Alb  2.7<L>  /  TBili  0.4  /  DBili  x   /  AST  16  /  ALT  8   /  AlkPhos  80  07-28    PTT - ( 27 Jul 2023 06:24 )  PTT:32.0 sec  Urinalysis Basic - ( 28 Jul 2023 06:55 )    Color: x / Appearance: x / SG: x / pH: x  Gluc: 88 mg/dL / Ketone: x  / Bili: x / Urobili: x   Blood: x / Protein: x / Nitrite: x   Leuk Esterase: x / RBC: x / WBC x   Sq Epi: x / Non Sq Epi: x / Bacteria: x                RADIOLOGY:           24H events:    Patient is a 67y old Female who presents with a chief complaint of SOB and weakness (27 Jul 2023 13:11)    Primary diagnosis of Pleural effusion       Today is hospital day 4d. This morning patient was seen and examined at bedside, resting comfortably in bed.  pt denied presenting with chest pain. pt reported currently no sob, nausea, diarrhea, constipation, denied recent infectious history. LOC episode overnight for about 10-15 seconds. pt denied incontinence, postictal confusion.    PAST MEDICAL & SURGICAL HISTORY  Hypertension    Hyperlipidemia    S/P cholecystectomy      SOCIAL HISTORY:  Social History:      ALLERGIES:  codeine (Hives)    MEDICATIONS:  STANDING MEDICATIONS  atorvastatin 20 milliGRAM(s) Oral at bedtime  chlorhexidine 2% Cloths 1 Application(s) Topical <User Schedule>  cyanocobalamin 1000 MICROGram(s) Oral daily  enoxaparin Injectable 40 milliGRAM(s) SubCutaneous every 24 hours  folic acid 1 milliGRAM(s) Oral daily  metoprolol tartrate 100 milliGRAM(s) Oral two times a day  pantoprazole    Tablet 40 milliGRAM(s) Oral before breakfast  polyethylene glycol 3350 17 Gram(s) Oral daily    PRN MEDICATIONS    VITALS:   T(F): 97.3  HR: 73  BP: 170/69  RR: 18  SpO2: 97%    PHYSICAL EXAM:  GENERAL: NAD, well-groomed, well-developed  HEAD:  Atraumatic, Normocephalic  EYES: EOMI  NECK: Supple  NERVOUS SYSTEM:  Alert & Oriented X3, non focal   CHEST/LUNG: Clear to auscultation bilaterally; No rales, rhonchi, wheezing, or rubs  HEART: Regular rate and rhythm; No murmurs, rubs, or gallops  ABDOMEN: Soft, Nontender, Nondistended; Bowel sounds present  EXTREMITIES:  2+ Peripheral Pulses, No clubbing, cyanosis, or edema  LYMPH: No lymphadenopathy noted  SKIN: No rashes or lesions  LABS:                        10.5   5.58  )-----------( 312      ( 28 Jul 2023 06:55 )             32.9     07-28    139  |  99  |  10  ----------------------------<  88  3.8   |  29  |  0.9    Ca    8.7      28 Jul 2023 06:55    TPro  6.1  /  Alb  2.7<L>  /  TBili  0.4  /  DBili  x   /  AST  16  /  ALT  8   /  AlkPhos  80  07-28    PTT - ( 27 Jul 2023 06:24 )  PTT:32.0 sec  Urinalysis Basic - ( 28 Jul 2023 06:55 )    Color: x / Appearance: x / SG: x / pH: x  Gluc: 88 mg/dL / Ketone: x  / Bili: x / Urobili: x   Blood: x / Protein: x / Nitrite: x   Leuk Esterase: x / RBC: x / WBC x   Sq Epi: x / Non Sq Epi: x / Bacteria: x                RADIOLOGY:

## 2023-07-28 NOTE — PROGRESS NOTE ADULT - ASSESSMENT
66 yo F PMHx HTN, DLD, and iron deficiency anemia presented to ED for evaluation of dyspnea on exertion, weakness, weight loss for the past six months and chest pain with BAILEY which began one day prior to presentation.    Pleuritic chest pain and BAILEY   Likely due to pleural effusion/pericardial effusion due to MTX use vs rheumatologic disorder 2/2 to left sided minimal pleural effusion, unknown etiology  - pt on RA with an KYUNG >95%  - pleural effusion too minimal for a thoracocentesis  - pulm recs appreciated  - hold lasix due to orthostatic hypotension  - etiology unclear  - ECG: chronic septal infarct with anterior ischemic changes with PACs and qtc of >470  - troponin neg x 3, elevated pro-BNP 1328  - HAIM elevated >1:250   - rheum recs appreciated, pending rheuma panel  - TSH and FT4 wnl, CK wnl, RF neg, HIV neg, CRP and ESR elevated  - QuantiFeron and lyme serology   - sent and pending: ANCA panel, PRASANTH, Anti-CCP, anti-smith, anti-dsDNA, Sjogren's syndrome ab, anti-centromere ab, Scl-70 ab, anti-RNA polymerase III ab, anticardiolipin ab, beta-2 glycoprotein, lupus anticoagulant  - daily chest XRs: minimal change in the left pleural effusion  - intake and output  - if SOB has no improved then trial course of steroids    Syncope  Likely due to orthostatic hypotension  - hold lasix, encourage good PO intake   - recheck orthostatics    Unintentional weight loss of 45lbs due to anorexia and dysgeusia  - likely secondary to recent extended use of methotrexate for >6 months until 5/2023 for PMR  - CT of C/A/P negative for any cancer  - less likely concerning for malignancy   - likely will need a few more weeks to regain appetite post MTX use  - MTX stopped due to peripheral neuropathy; will obtain B12 and FA levels  - pt on B12 and FA; folinic acid not available  - Colonoscopy in 1/2023, endoscopy in 5/2023 were wnl  - Mammogram scheduled for 8/2023, and last year had manual breast exam; last mammo was wnl  - pap smear in 2022 was wnl  - smoked 1PPD until she quit in 1998; no exposure to secondhand smoke  - was in the [address] [place] [address] on 9/11, but immediately evacuated the building and the area with minimal prolonged exposure to on-site toxins  - traveled to [place] in the last one year   - Had COVID in 3/023 (did not require O2, steroids, or remdesivir) and is vaccinated x 2 plus 1 booster for COVID  - b/l LE duplex negative    Normocytic anemia  - hgb stble    #Polymyalgia Rheumatica   - follows op with Dr. Roseann Claros  - previously was on prednisolone until late 2022 and MTX until 5/2023 as per OP notes  - rheum recs appreciated  - strong FH of AI diseases    HTN  HLD  - c/w home meds - metoprolol and hydralazine   - c/w lipitor 20mg     GERD   -c/w pantoprazole      #prophylaxis  VTE: lovenox   GI: pantoprazole and miralax  ambulate as tolerated   DASH diet

## 2023-07-28 NOTE — PROGRESS NOTE ADULT - ASSESSMENT
68 y/o female with pmhx of HT, HL and anemia likely secondary to iron deficiency presented to ED with dyspnea on exertion, weakness, weight loss and chest pain for 1 month which worsened since yesterday.     #Dyspnea on exertion   #Loss of appetite   - Symptomatic since March 23  - Labs - D-dimer 5k and bnp -1.3k   - CTA - no PE; b/l effusion L>R; mild pericardial effusion and prominent mediastinal nodes   - Pulm consult for thoracentesis but effusion was minimal: continue diuretics  -CT abdomen/pelvis negative for lympadenopathy/ pelvic mass, postive for small pericardial and b/l pleural effusions    -TTE and lasix 20mg PO     #Weight loss - 45lbs in 6 months  #Loss of appetite    #Anemia 2/2 suspected MERRICK  - received 1pRBC 20days back and iron IV in OP  - last hb 10      #Polymyalgia Rheumatica   - was previously on Mtx and prednisone   - no meds or symptoms now   - follows with Dr Salazar  -rheumatology consult: Pending ESR, CRP, ANCA panel, PRASANTH, Anti-CCP, anti-smith, anti-dsDNA, Sjogren's syndrome ab, anti-centromere ab, Scl-70 ab, anti-RNA polymerase III ab, anticardiolipin ab, beta-2 glycoprotein, lupus anticoagulant        #HT  #HL   - c/w home meds - metoprolol and hydralazine   - c/w lipitor 20mg   - Lipids and a1c in AM      #GERD   -c/w pantop       68 y/o female with pmhx of HT, HL and anemia likely secondary to iron deficiency presented to ED with dyspnea on exertion, weakness, weight loss and chest pain for 1 month which worsened since yesterday.     #Dyspnea on exertion   #Loss of appetite   - Symptomatic since March 23  - Labs - D-dimer 5k and bnp -1.3k   - CTA - no PE; b/l effusion L>R; mild pericardial effusion and prominent mediastinal nodes   - Pulm consult for thoracentesis but effusion was minimal: continue diuretics  -CT abdomen/pelvis negative for lympadenopathy/ pelvic mass, postive for small pericardial and b/l pleural effusions    -TTE EF 67% and lasix 20mg PO       #unintentional weight loss of 45lbs due to anorexia and dysgeusia  - likely secondary to recent extended use of methotrexate for >6 months until 5/2023 for PMR  - CT of C/A/P negative for any cancer  - less likely concerning for malignancy   - likely will need a few more weeks to regain appetite post MTX use  - MTX stopped due to peripheral neuropathy; will obtain B12 and FA levels  - pt on B12 and FA; folinic acid not available  - Colonoscopy in 1/2023, endoscopy in 5/2023 were wnl  - Mammogram scheduled for 8/2023, and last year had manual breast exam; last mammo was wnl  - pap smear in 2022 was wnl  - smoked 1PPD until she quit in 1998; no exposure to secondhand smoke  - was in the [address] [place] [address] on 9/11, but immediately evacuated the building and the area with minimal prolonged exposure to on-site toxins  - traveled to [place] in the last one year   - Had COVID in 3/023 (did not require O2, steroids, or remdesivir) and is vaccinated x 2 plus 1 booster for COVID  - b/l LE duplex negative    #Anemia 2/2 suspected MERRICK  - received 1pRBC 20days back and iron IV in OP  - last hb 10      #Polymyalgia Rheumatica   - was previously on Mtx and prednisone   - no meds or symptoms now   - follows with Dr Salazar  -rheumatology consult: Pending ESR, CRP, ANCA panel, PRASANTH, Anti-CCP, anti-smith, anti-dsDNA, Sjogren's syndrome ab, anti-centromere ab, Scl-70 ab, anti-RNA polymerase III ab, anticardiolipin ab, beta-2 glycoprotein, lupus anticoagulant        #HT  #HL   - c/w home meds - metoprolol and hydralazine   - c/w lipitor 20mg   - Lipids and a1c in AM      #GERD   -c/w pantop       68 y/o female with pmhx of HT, HL and anemia likely secondary to iron deficiency presented to ED with dyspnea on exertion, weakness, weight loss and chest pain for 1 month which worsened since yesterday.     #Dyspnea on exertion   #Loss of appetite   - Symptomatic since March 23  - Labs - D-dimer 5k and bnp -1.3k   - CTA - no PE; b/l effusion L>R; mild pericardial effusion and prominent mediastinal nodes   - Pulm consult for thoracentesis but effusion was minimal: continue diuretics  -CT abdomen/pelvis negative for lympadenopathy/ pelvic mass, postive for small pericardial and b/l pleural effusions    -TTE EF 67% and lasix 20mg PO       #unintentional weight loss of 45lbs due to anorexia and dysgeusia  - likely secondary to recent extended use of methotrexate for >6 months until 5/2023 for PMR  - CT of C/A/P negative for any cancer  - less likely concerning for malignancy   - likely will need a few more weeks to regain appetite post MTX use  - MTX stopped due to peripheral neuropathy; will obtain B12 and FA levels  - pt on B12 and FA; folinic acid not available  - Colonoscopy in 1/2023, endoscopy in 5/2023 were wnl  - Mammogram scheduled for 8/2023, and last year had manual breast exam; last mammo was wnl  - pap smear in 2022 was wnl  - Had COVID in 3/023 (did not require O2, steroids, or remdesivir) and is vaccinated x 2 plus 1 booster for COVID  - b/l LE duplex negative    #Syncope/Seizures?  likely vasovagal, likely 2/2 hypovolemia form diuresis   orthostatics positive  CT head negative  EEG results pending    #Anemia 2/2 suspected MERRICK  - received 1pRBC 20days back and iron IV in OP  - last hb 10      #Polymyalgia Rheumatica   - was previously on Mtx and prednisone   - no meds or symptoms now   - follows with Dr Salazar  -rheumatology consult: Pending ESR, CRP, ANCA panel, PRASANTH, Anti-CCP, anti-smith, anti-dsDNA, Sjogren's syndrome ab, anti-centromere ab, Scl-70 ab, anti-RNA polymerase III ab, anticardiolipin ab, beta-2 glycoprotein, lupus anticoagulant        #HT  #HL   - c/w home meds - metoprolol and hydralazine   - c/w lipitor 20mg   - Lipids and a1c in AM      #GERD   -c/w pantop

## 2023-07-29 LAB
ALBUMIN SERPL ELPH-MCNC: 2.9 G/DL — LOW (ref 3.5–5.2)
ALP SERPL-CCNC: 78 U/L — SIGNIFICANT CHANGE UP (ref 30–115)
ALT FLD-CCNC: 11 U/L — SIGNIFICANT CHANGE UP (ref 0–41)
ANION GAP SERPL CALC-SCNC: 9 MMOL/L — SIGNIFICANT CHANGE UP (ref 7–14)
ANTI-RIBONUCLEAR PROTEIN: <0.2 AI — SIGNIFICANT CHANGE UP
AST SERPL-CCNC: 20 U/L — SIGNIFICANT CHANGE UP (ref 0–41)
BASOPHILS # BLD AUTO: 0.03 K/UL — SIGNIFICANT CHANGE UP (ref 0–0.2)
BASOPHILS NFR BLD AUTO: 0.4 % — SIGNIFICANT CHANGE UP (ref 0–1)
BILIRUB SERPL-MCNC: 0.5 MG/DL — SIGNIFICANT CHANGE UP (ref 0.2–1.2)
BUN SERPL-MCNC: 12 MG/DL — SIGNIFICANT CHANGE UP (ref 10–20)
CALCIUM SERPL-MCNC: 8.5 MG/DL — SIGNIFICANT CHANGE UP (ref 8.4–10.5)
CENTROMERE AB SER-ACNC: <0.2 AI — SIGNIFICANT CHANGE UP
CHLORIDE SERPL-SCNC: 96 MMOL/L — LOW (ref 98–110)
CO2 SERPL-SCNC: 30 MMOL/L — SIGNIFICANT CHANGE UP (ref 17–32)
CREAT SERPL-MCNC: 0.9 MG/DL — SIGNIFICANT CHANGE UP (ref 0.7–1.5)
DSDNA AB FLD-ACNC: <0.2 AI — SIGNIFICANT CHANGE UP
DSDNA AB SER-ACNC: 12 IU/ML — SIGNIFICANT CHANGE UP
EGFR: 69 ML/MIN/1.73M2 — SIGNIFICANT CHANGE UP
ENA SCL70 AB SER-ACNC: <0.2 AI — SIGNIFICANT CHANGE UP
ENA SM AB FLD QL: <0.2 AI — SIGNIFICANT CHANGE UP
ENA SS-A AB FLD IA-ACNC: <0.2 AI — SIGNIFICANT CHANGE UP
EOSINOPHIL # BLD AUTO: 0.24 K/UL — SIGNIFICANT CHANGE UP (ref 0–0.7)
EOSINOPHIL NFR BLD AUTO: 3.2 % — SIGNIFICANT CHANGE UP (ref 0–8)
GAMMA INTERFERON BACKGROUND BLD IA-ACNC: 0.03 IU/ML — SIGNIFICANT CHANGE UP
GLUCOSE SERPL-MCNC: 95 MG/DL — SIGNIFICANT CHANGE UP (ref 70–99)
HCT VFR BLD CALC: 32.7 % — LOW (ref 37–47)
HGB BLD-MCNC: 10.4 G/DL — LOW (ref 12–16)
IMM GRANULOCYTES NFR BLD AUTO: 0.8 % — HIGH (ref 0.1–0.3)
LYMPHOCYTES # BLD AUTO: 1.15 K/UL — LOW (ref 1.2–3.4)
LYMPHOCYTES # BLD AUTO: 15.2 % — LOW (ref 20.5–51.1)
M TB IFN-G BLD-IMP: ABNORMAL
M TB IFN-G CD4+ BCKGRND COR BLD-ACNC: 0 IU/ML — SIGNIFICANT CHANGE UP
M TB IFN-G CD4+CD8+ BCKGRND COR BLD-ACNC: 0 IU/ML — SIGNIFICANT CHANGE UP
MAGNESIUM SERPL-MCNC: 1.7 MG/DL — LOW (ref 1.8–2.4)
MCHC RBC-ENTMCNC: 26.9 PG — LOW (ref 27–31)
MCHC RBC-ENTMCNC: 31.8 G/DL — LOW (ref 32–37)
MCV RBC AUTO: 84.7 FL — SIGNIFICANT CHANGE UP (ref 81–99)
MONOCYTES # BLD AUTO: 0.53 K/UL — SIGNIFICANT CHANGE UP (ref 0.1–0.6)
MONOCYTES NFR BLD AUTO: 7 % — SIGNIFICANT CHANGE UP (ref 1.7–9.3)
NEUTROPHILS # BLD AUTO: 5.54 K/UL — SIGNIFICANT CHANGE UP (ref 1.4–6.5)
NEUTROPHILS NFR BLD AUTO: 73.4 % — SIGNIFICANT CHANGE UP (ref 42.2–75.2)
NRBC # BLD: 0 /100 WBCS — SIGNIFICANT CHANGE UP (ref 0–0)
PLATELET # BLD AUTO: 310 K/UL — SIGNIFICANT CHANGE UP (ref 130–400)
PMV BLD: 9.5 FL — SIGNIFICANT CHANGE UP (ref 7.4–10.4)
POTASSIUM SERPL-MCNC: 3.8 MMOL/L — SIGNIFICANT CHANGE UP (ref 3.5–5)
POTASSIUM SERPL-SCNC: 3.8 MMOL/L — SIGNIFICANT CHANGE UP (ref 3.5–5)
PROT SERPL-MCNC: 6 G/DL — SIGNIFICANT CHANGE UP (ref 6–8)
QUANT TB PLUS MITOGEN MINUS NIL: 0.42 IU/ML — SIGNIFICANT CHANGE UP
RBC # BLD: 3.86 M/UL — LOW (ref 4.2–5.4)
RBC # FLD: 16 % — HIGH (ref 11.5–14.5)
SODIUM SERPL-SCNC: 135 MMOL/L — SIGNIFICANT CHANGE UP (ref 135–146)
WBC # BLD: 7.55 K/UL — SIGNIFICANT CHANGE UP (ref 4.8–10.8)
WBC # FLD AUTO: 7.55 K/UL — SIGNIFICANT CHANGE UP (ref 4.8–10.8)

## 2023-07-29 PROCEDURE — 93308 TTE F-UP OR LMTD: CPT | Mod: 26

## 2023-07-29 PROCEDURE — 71045 X-RAY EXAM CHEST 1 VIEW: CPT | Mod: 26

## 2023-07-29 PROCEDURE — 99232 SBSQ HOSP IP/OBS MODERATE 35: CPT

## 2023-07-29 RX ORDER — SODIUM CHLORIDE 9 MG/ML
250 INJECTION, SOLUTION INTRAVENOUS ONCE
Refills: 0 | Status: COMPLETED | OUTPATIENT
Start: 2023-07-29 | End: 2023-07-29

## 2023-07-29 RX ADMIN — CHLORHEXIDINE GLUCONATE 1 APPLICATION(S): 213 SOLUTION TOPICAL at 05:14

## 2023-07-29 RX ADMIN — Medication 1 MILLIGRAM(S): at 12:07

## 2023-07-29 RX ADMIN — POLYETHYLENE GLYCOL 3350 17 GRAM(S): 17 POWDER, FOR SOLUTION ORAL at 12:07

## 2023-07-29 RX ADMIN — Medication 60 MILLIGRAM(S): at 12:09

## 2023-07-29 RX ADMIN — ENOXAPARIN SODIUM 40 MILLIGRAM(S): 100 INJECTION SUBCUTANEOUS at 17:32

## 2023-07-29 RX ADMIN — Medication 100 MILLIGRAM(S): at 17:32

## 2023-07-29 RX ADMIN — Medication 100 MILLIGRAM(S): at 05:14

## 2023-07-29 RX ADMIN — ATORVASTATIN CALCIUM 20 MILLIGRAM(S): 80 TABLET, FILM COATED ORAL at 21:46

## 2023-07-29 RX ADMIN — PREGABALIN 1000 MICROGRAM(S): 225 CAPSULE ORAL at 12:08

## 2023-07-29 RX ADMIN — SODIUM CHLORIDE 250 MILLILITER(S): 9 INJECTION, SOLUTION INTRAVENOUS at 12:08

## 2023-07-29 RX ADMIN — PANTOPRAZOLE SODIUM 40 MILLIGRAM(S): 20 TABLET, DELAYED RELEASE ORAL at 05:14

## 2023-07-29 NOTE — DIETITIAN INITIAL EVALUATION ADULT - OTHER CALCULATIONS
Estimated Calorie Needs: [id number] x AF 1.3-1.2=0956-5002phdi/day -Due to PCM  Estimated Protein Needs: 77-89grams/day (1.3-1.5grams/kg of admit weight) -Due to PCM  Estimated Fluid Needs: 1395-1610mL/day (1mL/kcal)

## 2023-07-29 NOTE — DIETITIAN INITIAL EVALUATION ADULT - PERTINENT LABORATORY DATA
07-29    135  |  96<L>  |  12  ----------------------------<  95  3.8   |  30  |  0.9    Ca    8.5      29 Jul 2023 06:40  Mg     1.7     07-29    TPro  6.0  /  Alb  2.9<L>  /  TBili  0.5  /  DBili  x   /  AST  20  /  ALT  11  /  AlkPhos  78  07-29

## 2023-07-29 NOTE — DIETITIAN INITIAL EVALUATION ADULT - NAME AND PHONE
Intervention: 1.Meals and Snacks 2.Medical Food Supplement 3.Nutrition Related Medication  Monitor/Evaluate: Diet order, energy intake, nutrition focused physical findings, Mg and anemia profile

## 2023-07-29 NOTE — PROGRESS NOTE ADULT - ASSESSMENT
68 y/o female with pmhx of HT, HL and anemia likely secondary to iron deficiency presented to ED with dyspnea on exertion, weakness, weight loss and chest pain for 1 month which worsened since yesterday.     #Dyspnea on exertion   #Loss of appetite   - Symptomatic since March 23  - Labs - D-dimer 5k and bnp -1.3k   - CTA - no PE; b/l effusion L>R; mild pericardial effusion and prominent mediastinal nodes   - Pulm consult for thoracentesis but effusion was minimal: continue diuretics  -CT abdomen/pelvis negative for lympadenopathy/ pelvic mass, postive for small pericardial and b/l pleural effusions    -TTE EF 67% and lasix 20mg PO       #unintentional weight loss of 45lbs due to anorexia and dysgeusia  - likely secondary to recent extended use of methotrexate for >6 months until 5/2023 for PMR  - CT of C/A/P negative for any cancer  - less likely concerning for malignancy   -vitabin b12 1991, folate nl  - Colonoscopy in 1/2023, endoscopy in 5/2023 were wnl  - Mammogram scheduled for 8/2023, and last year had manual breast exam; last mammo was wnl  - pap smear in 2022 was wnl  - Had COVID in 3/023 (did not require O2, steroids, or remdesivir) and is vaccinated x 2 plus 1 booster for COVID  - b/l LE duplex negative    #Syncope/Seizures?  likely vasovagal, likely 2/2 hypovolemia form diuresis   orthostatics positive  CT head negative  EEG results pending    #Anemia 2/2 suspected MERRICK  - received 1pRBC 20days back and iron IV in OP  - last hb 10      #Polymyalgia Rheumatica   - was previously on Mtx and prednisone   - no meds or symptoms now   - follows with Dr Salazar  -rheumatology consult: Anticardiolipin IgM elevated at 20.5 Pending  ANCA panel, PRASANTH, Anti-CCP, anti-smith, anti-dsDNA, Sjogren's syndrome ab, anti-centromere ab, Scl-70 ab, anti-RNA polymerase III ab, anticardiolipin ab, beta-2 glycoprotein, lupus anticoagulant        #HTN  #HLD  - c/w home meds - metoprolol and hydralazine   - c/w lipitor 20mg   - Lipids and a1c in AM      #GERD   -c/w pantop       68 y/o female with pmhx of HT, HL and anemia likely secondary to iron deficiency presented to ED with dyspnea on exertion, weakness, weight loss and chest pain for 1 month which worsened since yesterday.     #Dyspnea on exertion   #Loss of appetite   - Symptomatic since March 23  - Labs - D-dimer 5k and bnp -1.3k   - CTA - no PE; b/l effusion L>R; mild pericardial effusion and prominent mediastinal nodes   - Pulm consult for thoracentesis but effusion was minimal: continue diuretics  -CT abdomen/pelvis negative for lympadenopathy/ pelvic mass, postive for small pericardial and b/l pleural effusions    -TTE EF 67% and lasix 20mg PO       #unintentional weight loss of 45lbs due to anorexia and dysgeusia  - likely secondary to recent extended use of methotrexate for >6 months until 5/2023 for PMR  - CT of C/A/P negative for any cancer  - less likely concerning for malignancy   -vitabin b12 1991, folate nl  - Colonoscopy in 1/2023, endoscopy in 5/2023 were wnl  - Mammogram scheduled for 8/2023, and last year had manual breast exam; last mammo was wnl  - pap smear in 2022 was wnl  - Had COVID in 3/023 (did not require O2, steroids, or remdesivir) and is vaccinated x 2 plus 1 booster for COVID  - b/l LE duplex negative    #Syncope  likely vasovagal, likely 2/2 hypovolemia form diuresis   orthostatics positive  CT head negative  EEG results pending  Orthostatics still positive, given some fluids  today    #Anemia 2/2 suspected MERRICK  - received 1pRBC 20days back and iron IV in OP  - last hb 10      #Polymyalgia Rheumatica   - was previously on Mtx and prednisone   - no meds or symptoms now   - follows with Dr Salazar  -rheumatology consult: Anticardiolipin IgM elevated at 20.5 Pending  ANCA panel, PRASANTH, Anti-CCP, anti-smith, anti-dsDNA, Sjogren's syndrome ab, anti-centromere ab, Scl-70 ab, anti-RNA polymerase III ab, anticardiolipin ab, beta-2 glycoprotein, lupus anticoagulant        #HTN  #HLD  - c/w home meds - metoprolol and hydralazine   - c/w lipitor 20mg   - Lipids and a1c in AM      #GERD   -c/w pantop

## 2023-07-29 NOTE — DIETITIAN NUTRITION RISK NOTIFICATION - TREATMENT: THE FOLLOWING DIET HAS BEEN RECOMMENDED
Diet, Regular:   Low Sodium  Supplement Feeding Modality:  Oral  Ensure Plus High Protein Cans or Servings Per Day:  1       Frequency:  Three Times a day (07-29-23 @ 21:51) [Pending Verification By Attending]  Diet, DASH/TLC:   Sodium & Cholesterol Restricted (07-25-23 @ 22:18) [Active]

## 2023-07-29 NOTE — DIETITIAN INITIAL EVALUATION ADULT - ORAL INTAKE PTA/DIET HISTORY
The patient reports following a regular diet at home; consumed two meals at <50%; took folic acid and Vitamin B12 supplements daily. Allergic to strawberries, melons, cantaloupe and honeydew melon.  The patient reports following a regular diet at home; consumed two meals at <50%; took folic acid and Vitamin B12 supplements daily. Allergic to strawberries, melons, cantaloupe and honeydew melon. Likes plain cheesecake without strawberries for lunch and dinner.

## 2023-07-29 NOTE — DIETITIAN INITIAL EVALUATION ADULT - MALNUTRITION
[FreeTextEntry1] : now using incruse and singulair daily\par feeling less winded, better but not 100%\par no cough or wheeze. Severe protein calorie malnutrition in the context of chronic illness

## 2023-07-29 NOTE — DIETITIAN INITIAL EVALUATION ADULT - PERTINENT MEDS FT
MEDICATIONS  (STANDING):  atorvastatin 20 milliGRAM(s) Oral at bedtime  chlorhexidine 2% Cloths 1 Application(s) Topical <User Schedule>  cyanocobalamin 1000 MICROGram(s) Oral daily  enoxaparin Injectable 40 milliGRAM(s) SubCutaneous every 24 hours  folic acid 1 milliGRAM(s) Oral daily  metoprolol tartrate 100 milliGRAM(s) Oral two times a day  pantoprazole    Tablet 40 milliGRAM(s) Oral before breakfast  polyethylene glycol 3350 17 Gram(s) Oral daily  predniSONE   Tablet 60 milliGRAM(s) Oral daily    MEDICATIONS  (PRN):

## 2023-07-29 NOTE — PROGRESS NOTE ADULT - ASSESSMENT
66 yo F PMHx HTN, DLD, and iron deficiency anemia presented to ED for evaluation of dyspnea on exertion, weakness, weight loss for the past six months and chest pain with BAILEY which began one day prior to presentation.    Pleuritic chest pain and BAILEY   Likely due to pleural effusion/pericardial effusion due to MTX use vs rheumatologic disorder 2/2 to left sided minimal pleural effusion, unknown etiology  - pt on RA with an KYUNG >95%  - pleural effusion too minimal for a thoracocentesis  - pulm recs appreciated  - continue to hold lasix due to orthostatic hypotension  - etiology unclear  - ECG: chronic septal infarct with anterior ischemic changes with PACs and qtc of >470  - troponin neg x 3, elevated pro-BNP 1328  - HAIM elevated >1:250   - rheum recs appreciated, pending rheuma panel  - TSH and FT4 wnl, CK wnl, RF neg, HIV neg, CRP and ESR elevated  - QuantiFeron and lyme serology   - sent and pending: ANCA panel, PRASANTH, Anti-CCP, anti-smith, anti-dsDNA, Sjogren's syndrome ab, anti-centromere ab, Scl-70 ab, anti-RNA polymerase III ab, anticardiolipin ab, beta-2 glycoprotein, lupus anticoagulant  - anticardiolipin positive--may suggest concuretn GCA but clinically no evidence of GCA. Rheum follow up.   - daily chest XRs: minimal change in the left pleural effusion  - intake and output  - trial prednisone to see if this helps symptoms  - recheck limited echo to assess for effusion worsening or improvement    Syncope  Likely due to orthostatic hypotension  - hold lasix, encourage good PO intake   - recheck orthostatics--remains positive  - give gentle hydration today    Unintentional weight loss of 45lbs due to anorexia and dysgeusia  - likely secondary to recent extended use of methotrexate for >6 months until 5/2023 for PMR  - CT of C/A/P negative for any cancer  - less likely concerning for malignancy   - likely will need a few more weeks to regain appetite post MTX use  - MTX stopped due to peripheral neuropathy; will obtain B12 and FA levels  - pt on B12 and FA; folinic acid not available  - Colonoscopy in 1/2023, endoscopy in 5/2023 were wnl  - Mammogram scheduled for 8/2023, and last year had manual breast exam; last mammo was wnl  - pap smear in 2022 was wnl  - smoked 1PPD until she quit in 1998; no exposure to secondhand smoke  - was in the [address] [place] [address] on 9/11, but immediately evacuated the building and the area with minimal prolonged exposure to on-site toxins  - traveled to [place] in the last one year   - Had COVID in 3/023 (did not require O2, steroids, or remdesivir) and is vaccinated x 2 plus 1 booster for COVID  - b/l LE duplex negative    Normocytic anemia  - hgb stble    #Polymyalgia Rheumatica   - follows op with Dr. Roseann Claros  - previously was on prednisolone until late 2022 and MTX until 5/2023 as per OP notes  - rheum recs appreciated  - strong FH of AI diseases    HTN  HLD  - c/w home meds - metoprolol and hydralazine   - c/w lipitor 20mg     GERD   -c/w pantoprazole      #prophylaxis  VTE: lovenox   GI: pantoprazole and miralax  ambulate as tolerated   DASH diet

## 2023-07-29 NOTE — DIETITIAN INITIAL EVALUATION ADULT - NSFNSGIIOFT_GEN_A_CORE
Dx: 66y/o female with h/o HTN, DLD and iron deficiency anemia,presented to ED for evaluation of dyspnea on exertion, weakness, weight loss for the past six months and chest pain with BAILEY which began one day prior to presentation. Noted to have pleural effusion/pericardial effusion due to MTX use vs rheumatologic disorder 2/2 to left sided minimal pleural effusion, unknown etiology.

## 2023-07-29 NOTE — PROGRESS NOTE ADULT - SUBJECTIVE AND OBJECTIVE BOX
CHIEF COMPLAINT:    Patient is a 67y old  Female who presents with a chief complaint of SOB and weakness     INTERVAL HPI/OVERNIGHT EVENTS:    Patient seen and examined at bedside. No acute overnight events occurred. yesterday afternoon pt was SOB walking and was tachycardiac    ROS: Denies SOB, chest pain. All other systems are negative.    Medications:  Standing  atorvastatin 20 milliGRAM(s) Oral at bedtime  chlorhexidine 2% Cloths 1 Application(s) Topical <User Schedule>  cyanocobalamin 1000 MICROGram(s) Oral daily  enoxaparin Injectable 40 milliGRAM(s) SubCutaneous every 24 hours  folic acid 1 milliGRAM(s) Oral daily  metoprolol tartrate 100 milliGRAM(s) Oral two times a day  pantoprazole    Tablet 40 milliGRAM(s) Oral before breakfast  polyethylene glycol 3350 17 Gram(s) Oral daily    PRN Meds        Vital Signs:    T(F): 98.2 (07-29-23 @ 05:14), Max: 99.9 (07-28-23 @ 20:50)  HR: 75 (07-29-23 @ 05:14) (73 - 123)  BP: 139/67 (07-29-23 @ 05:14) (130/60 - 155/70)  RR: 18 (07-29-23 @ 05:14) (17 - 18)  SpO2: 100% (07-28-23 @ 16:43) (100% - 100%)  I&O's Summary    28 Jul 2023 07:01  -  29 Jul 2023 07:00  --------------------------------------------------------  IN: 808 mL / OUT: 900 mL / NET: -92 mL      Daily     Daily   CAPILLARY BLOOD GLUCOSE          PHYSICAL EXAM:  GENERAL:  NAD  SKIN: No rashes or lesions  HEENT: Atraumatic. Normocephalic. Anicteric  NECK:  No JVD.   PULMONARY: Clear to ausculation bilaterally. No wheezing. No rales  CVS: Normal S1, S2. Regular rate and rhythm. No murmurs.  ABDOMEN/GI: Soft, Nontender, Nondistended; Bowel sounds are present  EXTREMITIES:  No edema B/L LE.  NEUROLOGIC:  No motor deficit.  PSYCH: Alert & oriented x 3, normal affect      LABS:                        10.4   7.55  )-----------( 310      ( 29 Jul 2023 06:40 )             32.7     07-29    135  |  96<L>  |  12  ----------------------------<  95  3.8   |  30  |  0.9    Ca    8.5      29 Jul 2023 06:40  Mg     1.7     07-29    TPro  6.0  /  Alb  2.9<L>  /  TBili  0.5  /  DBili  x   /  AST  20  /  ALT  11  /  AlkPhos  78  07-29              RADIOLOGY & ADDITIONAL TESTS:  Imaging or report Personally Reviewed:  [ ] YES  [ ] NO -->no new images    Telemetry reviewed independently - NSR, no acute events  EKG reviewed independently -->no new EKGs    Consultant(s) Notes Reviewed:  [ ] YES  [ ] NO  Care Discussed with Consultants/Other Providers [ ] YES  [ ] NO    Case discussed with resident  Care discussed with pt

## 2023-07-30 LAB
ALBUMIN SERPL ELPH-MCNC: 3.1 G/DL — LOW (ref 3.5–5.2)
ALP SERPL-CCNC: 86 U/L — SIGNIFICANT CHANGE UP (ref 30–115)
ALT FLD-CCNC: 17 U/L — SIGNIFICANT CHANGE UP (ref 0–41)
ANION GAP SERPL CALC-SCNC: 13 MMOL/L — SIGNIFICANT CHANGE UP (ref 7–14)
AST SERPL-CCNC: 25 U/L — SIGNIFICANT CHANGE UP (ref 0–41)
AUTO DIFF PNL BLD: NEGATIVE — SIGNIFICANT CHANGE UP
BASOPHILS # BLD AUTO: 0.01 K/UL — SIGNIFICANT CHANGE UP (ref 0–0.2)
BASOPHILS NFR BLD AUTO: 0.1 % — SIGNIFICANT CHANGE UP (ref 0–1)
BILIRUB SERPL-MCNC: 0.4 MG/DL — SIGNIFICANT CHANGE UP (ref 0.2–1.2)
BUN SERPL-MCNC: 14 MG/DL — SIGNIFICANT CHANGE UP (ref 10–20)
C-ANCA SER-ACNC: NEGATIVE — SIGNIFICANT CHANGE UP
CALCIUM SERPL-MCNC: 9.5 MG/DL — SIGNIFICANT CHANGE UP (ref 8.4–10.5)
CHLORIDE SERPL-SCNC: 98 MMOL/L — SIGNIFICANT CHANGE UP (ref 98–110)
CO2 SERPL-SCNC: 26 MMOL/L — SIGNIFICANT CHANGE UP (ref 17–32)
CREAT SERPL-MCNC: 0.8 MG/DL — SIGNIFICANT CHANGE UP (ref 0.7–1.5)
EGFR: 80 ML/MIN/1.73M2 — SIGNIFICANT CHANGE UP
EOSINOPHIL # BLD AUTO: 0 K/UL — SIGNIFICANT CHANGE UP (ref 0–0.7)
EOSINOPHIL NFR BLD AUTO: 0 % — SIGNIFICANT CHANGE UP (ref 0–8)
GLUCOSE SERPL-MCNC: 144 MG/DL — HIGH (ref 70–99)
HCT VFR BLD CALC: 37.5 % — SIGNIFICANT CHANGE UP (ref 37–47)
HGB BLD-MCNC: 12 G/DL — SIGNIFICANT CHANGE UP (ref 12–16)
IMM GRANULOCYTES NFR BLD AUTO: 0.5 % — HIGH (ref 0.1–0.3)
LYMPHOCYTES # BLD AUTO: 0.95 K/UL — LOW (ref 1.2–3.4)
LYMPHOCYTES # BLD AUTO: 8.2 % — LOW (ref 20.5–51.1)
MAGNESIUM SERPL-MCNC: 1.9 MG/DL — SIGNIFICANT CHANGE UP (ref 1.8–2.4)
MCHC RBC-ENTMCNC: 26.8 PG — LOW (ref 27–31)
MCHC RBC-ENTMCNC: 32 G/DL — SIGNIFICANT CHANGE UP (ref 32–37)
MCV RBC AUTO: 83.9 FL — SIGNIFICANT CHANGE UP (ref 81–99)
MONOCYTES # BLD AUTO: 0.41 K/UL — SIGNIFICANT CHANGE UP (ref 0.1–0.6)
MONOCYTES NFR BLD AUTO: 3.5 % — SIGNIFICANT CHANGE UP (ref 1.7–9.3)
NEUTROPHILS # BLD AUTO: 10.19 K/UL — HIGH (ref 1.4–6.5)
NEUTROPHILS NFR BLD AUTO: 87.7 % — HIGH (ref 42.2–75.2)
NRBC # BLD: 0 /100 WBCS — SIGNIFICANT CHANGE UP (ref 0–0)
O2 CT VFR BLD CALC: ABNORMAL
P-ANCA SER-ACNC: POSITIVE
PLATELET # BLD AUTO: 363 K/UL — SIGNIFICANT CHANGE UP (ref 130–400)
PMV BLD: 10 FL — SIGNIFICANT CHANGE UP (ref 7.4–10.4)
POTASSIUM SERPL-MCNC: 3.9 MMOL/L — SIGNIFICANT CHANGE UP (ref 3.5–5)
POTASSIUM SERPL-SCNC: 3.9 MMOL/L — SIGNIFICANT CHANGE UP (ref 3.5–5)
PROT SERPL-MCNC: 7 G/DL — SIGNIFICANT CHANGE UP (ref 6–8)
RBC # BLD: 4.47 M/UL — SIGNIFICANT CHANGE UP (ref 4.2–5.4)
RBC # FLD: 15.6 % — HIGH (ref 11.5–14.5)
RNAP III AB SER-ACNC: 12 UNITS — SIGNIFICANT CHANGE UP (ref 0–19)
SODIUM SERPL-SCNC: 137 MMOL/L — SIGNIFICANT CHANGE UP (ref 135–146)
WBC # BLD: 11.62 K/UL — HIGH (ref 4.8–10.8)
WBC # FLD AUTO: 11.62 K/UL — HIGH (ref 4.8–10.8)

## 2023-07-30 PROCEDURE — 71045 X-RAY EXAM CHEST 1 VIEW: CPT | Mod: 26

## 2023-07-30 PROCEDURE — 99232 SBSQ HOSP IP/OBS MODERATE 35: CPT

## 2023-07-30 RX ORDER — SODIUM CHLORIDE 9 MG/ML
500 INJECTION, SOLUTION INTRAVENOUS ONCE
Refills: 0 | Status: COMPLETED | OUTPATIENT
Start: 2023-07-30 | End: 2023-07-30

## 2023-07-30 RX ADMIN — Medication 100 MILLIGRAM(S): at 17:24

## 2023-07-30 RX ADMIN — CHLORHEXIDINE GLUCONATE 1 APPLICATION(S): 213 SOLUTION TOPICAL at 06:11

## 2023-07-30 RX ADMIN — Medication 100 MILLIGRAM(S): at 06:13

## 2023-07-30 RX ADMIN — PANTOPRAZOLE SODIUM 40 MILLIGRAM(S): 20 TABLET, DELAYED RELEASE ORAL at 06:13

## 2023-07-30 RX ADMIN — ATORVASTATIN CALCIUM 20 MILLIGRAM(S): 80 TABLET, FILM COATED ORAL at 21:45

## 2023-07-30 RX ADMIN — ENOXAPARIN SODIUM 40 MILLIGRAM(S): 100 INJECTION SUBCUTANEOUS at 17:23

## 2023-07-30 RX ADMIN — SODIUM CHLORIDE 500 MILLILITER(S): 9 INJECTION, SOLUTION INTRAVENOUS at 15:42

## 2023-07-30 RX ADMIN — POLYETHYLENE GLYCOL 3350 17 GRAM(S): 17 POWDER, FOR SOLUTION ORAL at 12:06

## 2023-07-30 RX ADMIN — PREGABALIN 1000 MICROGRAM(S): 225 CAPSULE ORAL at 12:05

## 2023-07-30 RX ADMIN — Medication 1 MILLIGRAM(S): at 12:05

## 2023-07-30 RX ADMIN — Medication 60 MILLIGRAM(S): at 06:13

## 2023-07-30 NOTE — PROGRESS NOTE ADULT - REASON FOR ADMISSION
SOB and weakness

## 2023-07-30 NOTE — PROGRESS NOTE ADULT - SUBJECTIVE AND OBJECTIVE BOX
CHIEF COMPLAINT:    Patient is a 67y old  Female who presents with a chief complaint of SOB      INTERVAL HPI/OVERNIGHT EVENTS:    Patient seen and examined at bedside. No acute overnight events occurred.    ROS: Reports SOB still with ambulation, improved from prior day. All other systems are negative.    Medications:  Standing  atorvastatin 20 milliGRAM(s) Oral at bedtime  cyanocobalamin 1000 MICROGram(s) Oral daily  enoxaparin Injectable 40 milliGRAM(s) SubCutaneous every 24 hours  folic acid 1 milliGRAM(s) Oral daily  lactated ringers Bolus 500 milliLiter(s) IV Bolus once  metoprolol tartrate 100 milliGRAM(s) Oral two times a day  pantoprazole    Tablet 40 milliGRAM(s) Oral before breakfast  polyethylene glycol 3350 17 Gram(s) Oral daily  predniSONE   Tablet 60 milliGRAM(s) Oral daily    PRN Meds        Vital Signs:    T(F): 97 (23 @ 13:26), Max: 97.6 (23 @ 20:59)  HR: 89 (23 @ 13:26) (70 - 89)  BP: 135/66 (23 @ 13:26) (130/63 - 167/77)  RR: 18 (23 @ 13:26) (18 - 18)  SpO2: --  I&O's Summary    2023 07:01  -  2023 07:00  --------------------------------------------------------  IN: 437 mL / OUT: 1750 mL / NET: -1313 mL      Daily Height in cm: 157.48 (2023 21:28)    Daily Weight in k.7 (2023 04:46)  CAPILLARY BLOOD GLUCOSE          PHYSICAL EXAM:  GENERAL:  NAD  SKIN: No rashes or lesions  HEENT: Atraumatic. Normocephalic. Anicteric  NECK:  No JVD.   PULMONARY: Clear to ausculation bilaterally. No wheezing. No rales  CVS: Normal S1, S2. Regular rate and rhythm. No murmurs.  ABDOMEN/GI: Soft, Nontender, Nondistended; Bowel sounds are present  EXTREMITIES:  No edema B/L LE.  NEUROLOGIC:  No motor deficit.  PSYCH: Alert & oriented x 3, normal affect      LABS:                        12.0   11.62 )-----------( 363      ( 2023 07:14 )             37.5         137  |  98  |  14  ----------------------------<  144<H>  3.9   |  26  |  0.8    Ca    9.5      2023 07:14  Mg     1.9         TPro  7.0  /  Alb  3.1<L>  /  TBili  0.4  /  DBili  x   /  AST  25  /  ALT  17  /  AlkPhos  86                RADIOLOGY & ADDITIONAL TESTS:  Imaging or report Personally Reviewed:  [ ] YES  [ ] NO -->no new images    Telemetry reviewed independently - NSR, no acute events  EKG reviewed independently -->no new EKGs    Consultant(s) Notes Reviewed:  [ ] YES  [ ] NO  Care Discussed with Consultants/Other Providers [ ] YES  [ ] NO    Case discussed with resident  Care discussed with pt

## 2023-07-30 NOTE — PROGRESS NOTE ADULT - ASSESSMENT
66 yo F PMHx HTN, DLD, and iron deficiency anemia presented to ED for evaluation of dyspnea on exertion, weakness, weight loss for the past six months and chest pain with BAILEY which began one day prior to presentation.    Pleuritic chest pain and BAILEY   Likely due to pleural effusion/pericardial effusion due to MTX use vs rheumatologic disorder 2/2 to left sided minimal pleural effusion, unknown etiology  - pt on RA with an KYUNG >95%  - pleural effusion too minimal for a thoracocentesis  - pulm recs appreciated  - continue to hold lasix due to orthostatic hypotension  - etiology unclear  - ECG: chronic septal infarct with anterior ischemic changes with PACs and qtc of >470  - troponin neg x 3, elevated pro-BNP 1328  - HAIM elevated >1:250   - rheum recs appreciated, pending rheuma panel  - TSH and FT4 wnl, CK wnl, RF neg, HIV neg, CRP and ESR elevated  - QuantiFeron and lyme serology   - sent and pending: ANCA panel, PRASANTH, Anti-CCP, anti-smith, anti-dsDNA, Sjogren's syndrome ab, anti-centromere ab, Scl-70 ab, anti-RNA polymerase III ab, anticardiolipin ab, beta-2 glycoprotein, lupus anticoagulant  - p-ANC positive  - anticardiolipin positive--may suggest concuretn GCA but clinically no evidence of GCA. Rheum follow up when office opne tomorrow  - daily chest XRs: minimal change in the left pleural effusion  - intake and output  - trial prednisone to see if this helps symptoms--feels somehwat improved  - recheck limited echo to assess for effusion worsening or improvement--effusion stable  - give IVF again today    Syncope  Likely due to orthostatic hypotension  - hold lasix, encourage good PO intake   - recheck orthostatics--remains positive  - give gentle hydration today    Unintentional weight loss of 45lbs due to anorexia and dysgeusia  - likely secondary to recent extended use of methotrexate for >6 months until 5/2023 for PMR  - CT of C/A/P negative for any cancer  - less likely concerning for malignancy   - likely will need a few more weeks to regain appetite post MTX use  - MTX stopped due to peripheral neuropathy; will obtain B12 and FA levels  - pt on B12 and FA; folinic acid not available  - Colonoscopy in 1/2023, endoscopy in 5/2023 were wnl  - Mammogram scheduled for 8/2023, and last year had manual breast exam; last mammo was wnl  - pap smear in 2022 was wnl  - smoked 1PPD until she quit in 1998; no exposure to secondhand smoke  - was in the [address] [place] [address] on 9/11, but immediately evacuated the building and the area with minimal prolonged exposure to on-site toxins  - traveled to [place] in the last one year   - Had COVID in 3/023 (did not require O2, steroids, or remdesivir) and is vaccinated x 2 plus 1 booster for COVID  - b/l LE duplex negative    Normocytic anemia  - hgb stble    #Polymyalgia Rheumatica   - follows op with Dr. Roseann Claros  - previously was on prednisolone until late 2022 and MTX until 5/2023 as per OP notes  - rheum recs appreciated  - strong FH of AI diseases    HTN  HLD  - c/w home meds - metoprolol and hydralazine   - c/w lipitor 20mg     GERD   -c/w pantoprazole      #prophylaxis  VTE: lovenox   GI: pantoprazole and miralax  ambulate as tolerated   DASH diet     #Progress Note Handoff:  Pending (specify):  Consults_________, Tests________, Test Results_______, Other_________  Family discussion: as above with pt and her partner, they do not feel ready for dc yet  Disposition: Home___/SNF___/Other________/Unknown at this time________

## 2023-07-31 ENCOUNTER — TRANSCRIPTION ENCOUNTER (OUTPATIENT)
Age: 68
End: 2023-07-31

## 2023-07-31 VITALS
SYSTOLIC BLOOD PRESSURE: 161 MMHG | TEMPERATURE: 97 F | HEART RATE: 74 BPM | RESPIRATION RATE: 18 BRPM | DIASTOLIC BLOOD PRESSURE: 68 MMHG | WEIGHT: 130.07 LBS

## 2023-07-31 LAB
ALBUMIN SERPL ELPH-MCNC: 3.1 G/DL — LOW (ref 3.5–5.2)
ALP SERPL-CCNC: 78 U/L — SIGNIFICANT CHANGE UP (ref 30–115)
ALT FLD-CCNC: 27 U/L — SIGNIFICANT CHANGE UP (ref 0–41)
ANION GAP SERPL CALC-SCNC: 10 MMOL/L — SIGNIFICANT CHANGE UP (ref 7–14)
AST SERPL-CCNC: 30 U/L — SIGNIFICANT CHANGE UP (ref 0–41)
BASOPHILS # BLD AUTO: 0.01 K/UL — SIGNIFICANT CHANGE UP (ref 0–0.2)
BASOPHILS NFR BLD AUTO: 0.1 % — SIGNIFICANT CHANGE UP (ref 0–1)
BILIRUB SERPL-MCNC: 0.2 MG/DL — SIGNIFICANT CHANGE UP (ref 0.2–1.2)
BUN SERPL-MCNC: 17 MG/DL — SIGNIFICANT CHANGE UP (ref 10–20)
CALCIUM SERPL-MCNC: 9.1 MG/DL — SIGNIFICANT CHANGE UP (ref 8.4–10.5)
CHLORIDE SERPL-SCNC: 101 MMOL/L — SIGNIFICANT CHANGE UP (ref 98–110)
CO2 SERPL-SCNC: 26 MMOL/L — SIGNIFICANT CHANGE UP (ref 17–32)
CREAT SERPL-MCNC: 0.7 MG/DL — SIGNIFICANT CHANGE UP (ref 0.7–1.5)
EGFR: 94 ML/MIN/1.73M2 — SIGNIFICANT CHANGE UP
EOSINOPHIL # BLD AUTO: 0.01 K/UL — SIGNIFICANT CHANGE UP (ref 0–0.7)
EOSINOPHIL NFR BLD AUTO: 0.1 % — SIGNIFICANT CHANGE UP (ref 0–8)
GLUCOSE SERPL-MCNC: 182 MG/DL — HIGH (ref 70–99)
HCT VFR BLD CALC: 31.7 % — LOW (ref 37–47)
HGB BLD-MCNC: 10 G/DL — LOW (ref 12–16)
IMM GRANULOCYTES NFR BLD AUTO: 0.8 % — HIGH (ref 0.1–0.3)
LYMPHOCYTES # BLD AUTO: 0.7 K/UL — LOW (ref 1.2–3.4)
LYMPHOCYTES # BLD AUTO: 6.2 % — LOW (ref 20.5–51.1)
MAGNESIUM SERPL-MCNC: 2 MG/DL — SIGNIFICANT CHANGE UP (ref 1.8–2.4)
MCHC RBC-ENTMCNC: 26.8 PG — LOW (ref 27–31)
MCHC RBC-ENTMCNC: 31.5 G/DL — LOW (ref 32–37)
MCV RBC AUTO: 85 FL — SIGNIFICANT CHANGE UP (ref 81–99)
MONOCYTES # BLD AUTO: 0.19 K/UL — SIGNIFICANT CHANGE UP (ref 0.1–0.6)
MONOCYTES NFR BLD AUTO: 1.7 % — SIGNIFICANT CHANGE UP (ref 1.7–9.3)
NEUTROPHILS # BLD AUTO: 10.33 K/UL — HIGH (ref 1.4–6.5)
NEUTROPHILS NFR BLD AUTO: 91.1 % — HIGH (ref 42.2–75.2)
NRBC # BLD: 0 /100 WBCS — SIGNIFICANT CHANGE UP (ref 0–0)
PLATELET # BLD AUTO: 268 K/UL — SIGNIFICANT CHANGE UP (ref 130–400)
PMV BLD: 10.8 FL — HIGH (ref 7.4–10.4)
POTASSIUM SERPL-MCNC: 3.7 MMOL/L — SIGNIFICANT CHANGE UP (ref 3.5–5)
POTASSIUM SERPL-SCNC: 3.7 MMOL/L — SIGNIFICANT CHANGE UP (ref 3.5–5)
PROT SERPL-MCNC: 6.4 G/DL — SIGNIFICANT CHANGE UP (ref 6–8)
RBC # BLD: 3.73 M/UL — LOW (ref 4.2–5.4)
RBC # FLD: 15.9 % — HIGH (ref 11.5–14.5)
SODIUM SERPL-SCNC: 137 MMOL/L — SIGNIFICANT CHANGE UP (ref 135–146)
WBC # BLD: 11.33 K/UL — HIGH (ref 4.8–10.8)
WBC # FLD AUTO: 11.33 K/UL — HIGH (ref 4.8–10.8)

## 2023-07-31 PROCEDURE — 71045 X-RAY EXAM CHEST 1 VIEW: CPT | Mod: 26

## 2023-07-31 PROCEDURE — 99238 HOSP IP/OBS DSCHRG MGMT 30/<: CPT

## 2023-07-31 RX ORDER — ATORVASTATIN CALCIUM 80 MG/1
1 TABLET, FILM COATED ORAL
Qty: 0 | Refills: 0 | DISCHARGE

## 2023-07-31 RX ORDER — HYDRALAZINE HCL 50 MG
1 TABLET ORAL
Refills: 0 | DISCHARGE

## 2023-07-31 RX ADMIN — Medication 1 MILLIGRAM(S): at 11:07

## 2023-07-31 RX ADMIN — PANTOPRAZOLE SODIUM 40 MILLIGRAM(S): 20 TABLET, DELAYED RELEASE ORAL at 05:27

## 2023-07-31 RX ADMIN — Medication 60 MILLIGRAM(S): at 05:27

## 2023-07-31 RX ADMIN — Medication 100 MILLIGRAM(S): at 05:27

## 2023-07-31 RX ADMIN — POLYETHYLENE GLYCOL 3350 17 GRAM(S): 17 POWDER, FOR SOLUTION ORAL at 11:07

## 2023-07-31 RX ADMIN — PREGABALIN 1000 MICROGRAM(S): 225 CAPSULE ORAL at 11:07

## 2023-07-31 NOTE — DISCHARGE NOTE PROVIDER - NSDCMRMEDTOKEN_GEN_ALL_CORE_FT
atorvastatin 20 mg oral tablet: 1 tablet orally once a day  folic acid 1 mg oral tablet: 1 tab(s) orally once a day  Metoprolol Tartrate 100 mg oral tablet: 1 tab(s) orally 2 times a day  pantoprazole 40 mg oral delayed release tablet: 1 tab(s) orally once a day  predniSONE 20 mg oral tablet: 3 tab(s) orally once a day  Vitamin B12 1000 mcg oral tablet: 1 tab(s) orally once a day

## 2023-07-31 NOTE — DISCHARGE NOTE NURSING/CASE MANAGEMENT/SOCIAL WORK - NSDCPEFALRISK_GEN_ALL_CORE
For information on Fall & Injury Prevention, visit: [email or web address] OR  [email or web address] OR  https://www.cdc.gov/steadi/patient.html

## 2023-07-31 NOTE — DISCHARGE NOTE PROVIDER - HOSPITAL COURSE
Pt presented for sob. Found to have pericardial and pleural effusions. Syncopized while here likely due to orthostatic hypotension from overdiuresis. Pt symptoms improved with IVF and steroids. Team spoke with rheuamtology today-recommend c/w steroids until seen in follow.

## 2023-07-31 NOTE — DISCHARGE NOTE PROVIDER - CARE PROVIDER_API CALL
Roseann Claros  [facility]  [address] [place] [address]  Phone: [phone]  Fax: [phone]  Follow Up Time:

## 2023-07-31 NOTE — DISCHARGE NOTE NURSING/CASE MANAGEMENT/SOCIAL WORK - PATIENT PORTAL LINK FT
You can access the FollowMyHealth Patient Portal offered by [facility] by registering at the following website: [email or web address]. By joining [facility]’s FollowMyHealth portal, you will also be able to view your health information using other applications (apps) compatible with our system.

## 2023-07-31 NOTE — DISCHARGE NOTE PROVIDER - NSDCPNSUBOBJ_GEN_ALL_CORE
Pt seen and examined at bedside with partner present. Pt feels well, no long tachycardiac or SOB when ambulating. Pt received news that her mother passed away today and needed to be urgently discharged. Pt was stable, aware to follow up with rheum and with cardio for pericardial effusion. team spoke with rheum-recommended continuing prednisone. pt discharged

## 2023-07-31 NOTE — DISCHARGE NOTE PROVIDER - NSDCFUSCHEDAPPT_GEN_ALL_CORE_FT
Georges Manzo  [facility]  [facility] [address]  Scheduled Appointment: 08/09/2023    Desean [place]  [facility] PreAdmits  Scheduled Appointment: 08/11/2023    [facility]  [facility] [address]  Scheduled Appointment: 08/11/2023    Desean [place]  [facility]  [facility] [address]  Scheduled Appointment: 08/22/2023    Desean [place]  [facility] PreAdmits  Scheduled Appointment: 08/22/2023

## 2023-07-31 NOTE — DISCHARGE NOTE PROVIDER - NSDCCPCAREPLAN_GEN_ALL_CORE_FT
PRINCIPAL DISCHARGE DIAGNOSIS  Diagnosis: Shortness of breath  Assessment and Plan of Treatment: You were found to have shortness of breath. This was likely due to inflammation from an autoimmune issue. You wre found to have a pleural effusion and a pericadial effusion. Please take steroids for the effusion as it is likely due to autoimmune issue and follow up with your rheumatologist/primary doctor as you will likely need a prolonged course of steroids.

## 2023-07-31 NOTE — DISCHARGE NOTE PROVIDER - DETAILS OF MALNUTRITION DIAGNOSIS/DIAGNOSES
This patient has been assessed with a concern for Malnutrition and was treated during this hospitalization for the following Nutrition diagnosis/diagnoses:     -  07/29/2023: Severe protein-calorie malnutrition

## 2023-08-01 LAB
MYELOPEROXIDASE AB SER-ACNC: 21.8 UNITS — HIGH
MYELOPEROXIDASE CELLS FLD QL: ABNORMAL

## 2023-08-09 ENCOUNTER — APPOINTMENT (OUTPATIENT)
Dept: PULMONOLOGY | Facility: CLINIC | Age: 68
End: 2023-08-09
Payer: MEDICARE

## 2023-08-09 VITALS
HEIGHT: 62 IN | HEART RATE: 91 BPM | RESPIRATION RATE: 14 BRPM | SYSTOLIC BLOOD PRESSURE: 122 MMHG | DIASTOLIC BLOOD PRESSURE: 78 MMHG | BODY MASS INDEX: 23.74 KG/M2 | WEIGHT: 129 LBS | OXYGEN SATURATION: 97 %

## 2023-08-09 DIAGNOSIS — K21.9 GASTRO-ESOPHAGEAL REFLUX DISEASE WITHOUT ESOPHAGITIS: ICD-10-CM

## 2023-08-09 DIAGNOSIS — Z87.891 PERSONAL HISTORY OF NICOTINE DEPENDENCE: ICD-10-CM

## 2023-08-09 DIAGNOSIS — I10 ESSENTIAL (PRIMARY) HYPERTENSION: ICD-10-CM

## 2023-08-09 DIAGNOSIS — J90 PLEURAL EFFUSION, NOT ELSEWHERE CLASSIFIED: ICD-10-CM

## 2023-08-09 DIAGNOSIS — D50.9 IRON DEFICIENCY ANEMIA, UNSPECIFIED: ICD-10-CM

## 2023-08-09 DIAGNOSIS — J91.8 PLEURAL EFFUSION IN OTHER CONDITIONS CLASSIFIED ELSEWHERE: ICD-10-CM

## 2023-08-09 DIAGNOSIS — I95.2 HYPOTENSION DUE TO DRUGS: ICD-10-CM

## 2023-08-09 DIAGNOSIS — Z88.5 ALLERGY STATUS TO NARCOTIC AGENT: ICD-10-CM

## 2023-08-09 DIAGNOSIS — M35.9 SYSTEMIC INVOLVEMENT OF CONNECTIVE TISSUE, UNSPECIFIED: ICD-10-CM
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
## 2023-08-09 DIAGNOSIS — E83.42 HYPOMAGNESEMIA: ICD-10-CM

## 2023-08-09 DIAGNOSIS — T45.1X5A ADVERSE EFFECT OF ANTINEOPLASTIC AND IMMUNOSUPPRESSIVE DRUGS, INITIAL ENCOUNTER: ICD-10-CM

## 2023-08-09 DIAGNOSIS — E78.5 HYPERLIPIDEMIA, UNSPECIFIED: ICD-10-CM

## 2023-08-09 DIAGNOSIS — T50.2X5A ADVERSE EFFECT OF CARBONIC-ANHYDRASE INHIBITORS, BENZOTHIADIAZIDES AND OTHER DIURETICS, INITIAL ENCOUNTER: ICD-10-CM

## 2023-08-09 DIAGNOSIS — I31.39 OTHER PERICARDIAL EFFUSION (NONINFLAMMATORY): ICD-10-CM

## 2023-08-09 DIAGNOSIS — E43 UNSPECIFIED SEVERE PROTEIN-CALORIE MALNUTRITION: ICD-10-CM

## 2023-08-09 DIAGNOSIS — M35.3 POLYMYALGIA RHEUMATICA: ICD-10-CM

## 2023-08-09 DIAGNOSIS — Z86.16 PERSONAL HISTORY OF COVID-19: ICD-10-CM

## 2023-08-09 PROCEDURE — 99213 OFFICE O/P EST LOW 20 MIN: CPT

## 2023-08-09 NOTE — REASON FOR VISIT
[Follow-Up] : a follow-up visit [TextBox_44] : Patient has a history of PMR.  She was recently in the hospital.  She was given steroids.  She was filling up with fluid at that point.  She was given diuretics.  Also the MTX was stopped.  The exact etiology of the shortness of breath and pulmonary edema is unknown.  The sed rate, HAIM, ANCA were all positive.  She is complaining of being woozy.

## 2023-08-09 NOTE — HISTORY OF PRESENT ILLNESS
[TextBox_4] : I personally reviewed the hospital record and I interpreted the most recent available chest films from the hospital.

## 2023-08-09 NOTE — ASSESSMENT
[FreeTextEntry1] : blunting of the costophrenic angles which may be related to tiny pleural effusions. She is undergoing a cardiac work-up. She states that she has renal disease due to the PMR.  I do not think that she has any overt lung disease. The patient has been cautioned to watch her diet especially as it relates to salt consumption. We will see what the cardiologist has to say.  She may need intermittent doses of low-dose diuretics. She is going to follow-up with nephrology.  The etiology of the woozy feeling is unclear.  I am going to put her on a small amount of steroids for a few days to make sure that she is not adrenal insufficient.  She is going to follow-up with rheumatology.

## 2023-08-14 ENCOUNTER — APPOINTMENT (OUTPATIENT)
Dept: HEMATOLOGY ONCOLOGY | Facility: CLINIC | Age: 68
End: 2023-08-14

## 2023-08-14 ENCOUNTER — LABORATORY RESULT (OUTPATIENT)
Age: 68
End: 2023-08-14

## 2023-08-14 ENCOUNTER — OUTPATIENT (OUTPATIENT)
Dept: OUTPATIENT SERVICES | Facility: HOSPITAL | Age: 68
LOS: 1 days | End: 2023-08-14
Payer: MEDICARE

## 2023-08-14 DIAGNOSIS — E61.1 IRON DEFICIENCY: ICD-10-CM

## 2023-08-14 DIAGNOSIS — Z90.49 ACQUIRED ABSENCE OF OTHER SPECIFIED PARTS OF DIGESTIVE TRACT: Chronic | ICD-10-CM

## 2023-08-14 LAB
ALBUMIN SERPL ELPH-MCNC: 3.4 G/DL
ALP BLD-CCNC: 76 U/L
ALT SERPL-CCNC: 19 U/L
ANION GAP SERPL CALC-SCNC: 12 MMOL/L
AST SERPL-CCNC: 17 U/L
BILIRUB DIRECT SERPL-MCNC: <0.2 MG/DL
BILIRUB INDIRECT SERPL-MCNC: >0.2 MG/DL
BILIRUB SERPL-MCNC: 0.4 MG/DL
BUN SERPL-MCNC: 19 MG/DL
CALCIUM SERPL-MCNC: 8.7 MG/DL
CHLORIDE SERPL-SCNC: 100 MMOL/L
CO2 SERPL-SCNC: 25 MMOL/L
CREAT SERPL-MCNC: 0.9 MG/DL
EGFR: 69 ML/MIN/1.73M2
GLUCOSE SERPL-MCNC: 105 MG/DL
HCT VFR BLD CALC: 34.4 %
HGB BLD-MCNC: 10.9 G/DL
IRON SATN MFR SERPL: 29 %
IRON SERPL-MCNC: 66 UG/DL
MCHC RBC-ENTMCNC: 27.4 PG
MCHC RBC-ENTMCNC: 31.7 G/DL
MCV RBC AUTO: 86.4 FL
PLATELET # BLD AUTO: 183 K/UL
PMV BLD: 10.6 FL
POTASSIUM SERPL-SCNC: 3.8 MMOL/L
PROT SERPL-MCNC: 6.3 G/DL
RBC # BLD: 3.98 M/UL
RBC # FLD: 17.1 %
SODIUM SERPL-SCNC: 137 MMOL/L
TIBC SERPL-MCNC: 224 UG/DL
UIBC SERPL-MCNC: 158 UG/DL
WBC # FLD AUTO: 10.54 K/UL

## 2023-08-14 PROCEDURE — 80048 BASIC METABOLIC PNL TOTAL CA: CPT

## 2023-08-14 PROCEDURE — 83550 IRON BINDING TEST: CPT

## 2023-08-14 PROCEDURE — 36415 COLL VENOUS BLD VENIPUNCTURE: CPT

## 2023-08-14 PROCEDURE — 85027 COMPLETE CBC AUTOMATED: CPT

## 2023-08-14 PROCEDURE — 83540 ASSAY OF IRON: CPT

## 2023-08-14 PROCEDURE — 82728 ASSAY OF FERRITIN: CPT

## 2023-08-14 PROCEDURE — 80076 HEPATIC FUNCTION PANEL: CPT

## 2023-08-15 DIAGNOSIS — E61.1 IRON DEFICIENCY: ICD-10-CM

## 2023-08-15 LAB — FERRITIN SERPL-MCNC: 1481 NG/ML

## 2023-08-22 ENCOUNTER — OUTPATIENT (OUTPATIENT)
Dept: OUTPATIENT SERVICES | Facility: HOSPITAL | Age: 68
LOS: 1 days | End: 2023-08-22
Payer: MEDICARE

## 2023-08-22 ENCOUNTER — APPOINTMENT (OUTPATIENT)
Dept: HEMATOLOGY ONCOLOGY | Facility: CLINIC | Age: 68
End: 2023-08-22
Payer: MEDICARE

## 2023-08-22 VITALS
SYSTOLIC BLOOD PRESSURE: 171 MMHG | BODY MASS INDEX: 23.92 KG/M2 | HEART RATE: 75 BPM | RESPIRATION RATE: 16 BRPM | WEIGHT: 130 LBS | DIASTOLIC BLOOD PRESSURE: 77 MMHG | HEIGHT: 62 IN

## 2023-08-22 DIAGNOSIS — E61.1 IRON DEFICIENCY: ICD-10-CM

## 2023-08-22 DIAGNOSIS — Z90.49 ACQUIRED ABSENCE OF OTHER SPECIFIED PARTS OF DIGESTIVE TRACT: Chronic | ICD-10-CM

## 2023-08-22 DIAGNOSIS — R63.4 ABNORMAL WEIGHT LOSS: ICD-10-CM

## 2023-08-22 PROCEDURE — 99215 OFFICE O/P EST HI 40 MIN: CPT

## 2023-08-22 NOTE — CONSULT LETTER
[Dear  ___] : Dear  [unfilled], [Consult Letter:] : I had the pleasure of evaluating your patient, [unfilled]. [Please see my note below.] : Please see my note below. [Sincerely,] : Sincerely, [FreeTextEntry3] : Desean [place] DO\par  Attending Physician,\par  Hematology/ Medical Oncology\par  [phone] office\par  \par

## 2023-08-22 NOTE — HISTORY OF PRESENT ILLNESS
[de-identified] : Ms. AMELIA JOHNSON is a 67 year old female here today for evaluation and management of anemia.    AMELIA is a 67 year old F with PMHx including Polymyalgia rheumatica, pleural effusion, HTN, hyperlipidemia, who presents to clinic to establish care.   She states she has not had anemia prior to this occasion.  She was taking MTX and prednisone for PMR but stopped a few days ago.  She follows with [facility], DR. Claros.  She reports SOB with exertion.  Last GYN exam was in June 2022.  She reports unintentional weight loss over the last year as well as occasional night sweats.  Patient denies CP, palpitations, dizziness, dyspnea at rest, hematuria or overt bleeding.  Patient reports no known family history of malignancy or blood/clotting disorder.  Former smoker, quit in 1998.    LAB WORKUP (5.15.2023 - [facility]) WBC 4.7, Hgb 8.7, MCV 86.7, RDW 13.9,  (5.10.2023 - [facility]) WBC 4.1, Hgb 8.6, MCV 87.7, RDW 13.9,  (4.26.2023 - [facility]) WBC 6.9, Hgb 8.5, MCV 86.4, RDW 14,  (4.22.2023 - [facility]) WBC 5.5, Hgb 8.8, MCV 88, RDW 14, , ironsat 11%, TIBC 228, ferritin 221, Vitamin B12 is 230, Folate >24, Cr 1.35, eGFR 43, normal LFTs  HCM Colonoscopy done in 01/2023 with Dr. Meadows which was reportedly normal Upper Endoscopy done in 05/2023 with Dr. Meadows which was reportedly normal  GYN Pelvic Exam/pap overdue  [de-identified] : 7/18/23 Patient is here for a follow-up visit for anemia.  She is feeling well but endorses fatigue.  She completed IV venofer in 07/2023.  Last CBC showed hgb down to 7.7g/dL so she was sent to ER and received 1 unit PRBC.  Patient states she developed RUQ discomfort last week and went to [facility] where they performed CT imaging and further workup including urine testing which showed blood.  She is now on oral abx (Cefuroxime) for suspected UTI.  Patient continues to take oral Vitamin B12 and folic acid once daily.  Reviewed most recent CBC, which shows hgb up to 10.1g/dL.  Patient denies CP, palpitations, dizziness, dyspnea, hematuria or PRBRB.    8/22/23 Patient is here for a follow-up visit for anemia.  She is feeling well compared to last month.  Since last visit, patient went to the hospital with complaint of fatigue, dyspnea and pain with inspiration.  Reviewed most recent CBC which shows hgb up to 10.9g/dL; ferritin 1481, ironsat 39%, TIBC 224.  Patient continues to take oral Vitamin B12 and folic acid once daily.  Patient denies CP, palpitations, dizziness, dyspnea, hematuria or PRBRB.  Reviewed most recent CT imaging from hospital which shows small bilateral pleural effusion and small pericardial effusion.  She was started on prednisone 60mg for 1 week due to suspicion that these findings were due to autoimmune etiology.  She recently followed with [facility] who supplied short term supply of prednisone 5mg due to concern regarding adrenal insufficiency.   CTa Chest (7.24.2023) IMPRESSION:1.  No central or segmental pulmonary embolus.2.  Bilateral pleural effusions with adjacent atelectasis.3.  Small pericardial effusion. CT A/P (7.25.2023)IMPRESSION:1. No evidence of abdominopelvic neoplastic disease.2. Small pericardial effusion. Small bilateral pleural effusions.

## 2023-08-22 NOTE — REVIEW OF SYSTEMS
[Fatigue] : fatigue [Recent Change In Weight] : ~T recent weight change [Diarrhea: Grade 0] : Diarrhea: Grade 0 [Negative] : Allergic/Immunologic [FreeTextEntry2] : 40lb weight loss in 6 months due to poor appetite

## 2023-08-22 NOTE — ASSESSMENT
[FreeTextEntry1] : # Anemia, normocytic ; vitamin B12 deficiency + iron deficiency  - s/p Colonoscopy done in 01/2023 with Dr. Meadows which was reportedly normal ; requested patient to obtain results for our review  - s/p Upper Endoscopy done in 05/2023 with Dr. Meadows which was reportedly normal ; requested patient to obtain results for our review  - followup with GI as recommended to discuss role of VCE  - followup with GYN as scheduled this month for complete pelvic examination  - s/p IV Venofer 200mg x 5 , completed in 07/2023  - CT C/A/P with IVC 07/2023 shows small bilateral pleural effusion and small pericardial effusion  - no additional IV iron needed at this time, hgb gradually improving up to 10.9g/dL - briefly discussed role of BMBx in the future if cytopenias worsen without clear etiology  - c/w Vitamin B12 1000mcg by mouth daily, Rx renewed  - c/w Folic Acid 1mg by mouth daily , Rx renewed  Encouraged to keep up to date with age appropriate screenings including but not limited to mammography and GYN pelvic exam. Advised to follow with [facility] as scheduled for management of PMR.    RTC 4 months with CBC, BMP, LFTs, iron studies, ferritin, MMA level 2 days prior   seen/ examined w/NP Diane; note reviewed; case discussed; agree w/ above  68 yo woman with no evidence of adenopathy or organomegaly on CT CAP IVC, with no clear B symptoms, has been evaluated for weight loss and weakness. Appreciated rheumatologist evaluation; after presumed diagnosis of PMR and the course of steroids, her pain and weakness improved; her appetite stabilzed. I do not have further suspicion for acute or chronic leukemia at this time and she should follow rheumatologist regularly. Moreover, advised to discuss further management of PMR since high dose steroids is not the best choice and she did not tolerate MTX. F/U given with [facility]

## 2023-11-02 PROBLEM — E78.5 HYPERLIPIDEMIA, UNSPECIFIED: Chronic | Status: ACTIVE | Noted: 2023-07-25

## 2023-11-02 PROBLEM — I10 ESSENTIAL (PRIMARY) HYPERTENSION: Chronic | Status: ACTIVE | Noted: 2023-07-25

## 2023-12-08 ENCOUNTER — EMERGENCY (EMERGENCY)
Facility: HOSPITAL | Age: 68
LOS: 0 days | Discharge: HOME | End: 2023-12-08
Attending: STUDENT IN AN ORGANIZED HEALTH CARE EDUCATION/TRAINING PROGRAM
Payer: MEDICARE

## 2023-12-08 VITALS
DIASTOLIC BLOOD PRESSURE: 95 MMHG | SYSTOLIC BLOOD PRESSURE: 222 MMHG | WEIGHT: 138.89 LBS | RESPIRATION RATE: 18 BRPM | OXYGEN SATURATION: 99 % | HEART RATE: 66 BPM | TEMPERATURE: 98 F | HEIGHT: 62 IN

## 2023-12-08 VITALS
RESPIRATION RATE: 16 BRPM | DIASTOLIC BLOOD PRESSURE: 78 MMHG | OXYGEN SATURATION: 100 % | SYSTOLIC BLOOD PRESSURE: 171 MMHG | HEART RATE: 68 BPM

## 2023-12-08 DIAGNOSIS — E78.5 HYPERLIPIDEMIA, UNSPECIFIED: ICD-10-CM

## 2023-12-08 DIAGNOSIS — M79.602 PAIN IN LEFT ARM: ICD-10-CM

## 2023-12-08 DIAGNOSIS — Z88.5 ALLERGY STATUS TO NARCOTIC AGENT: ICD-10-CM

## 2023-12-08 DIAGNOSIS — I10 ESSENTIAL (PRIMARY) HYPERTENSION: ICD-10-CM

## 2023-12-08 DIAGNOSIS — Z90.49 ACQUIRED ABSENCE OF OTHER SPECIFIED PARTS OF DIGESTIVE TRACT: Chronic | ICD-10-CM

## 2023-12-08 DIAGNOSIS — M79.601 PAIN IN RIGHT ARM: ICD-10-CM

## 2023-12-08 LAB
ALBUMIN SERPL ELPH-MCNC: 4.2 G/DL — SIGNIFICANT CHANGE UP (ref 3.5–5.2)
ALBUMIN SERPL ELPH-MCNC: 4.2 G/DL — SIGNIFICANT CHANGE UP (ref 3.5–5.2)
ALP SERPL-CCNC: 107 U/L — SIGNIFICANT CHANGE UP (ref 30–115)
ALP SERPL-CCNC: 107 U/L — SIGNIFICANT CHANGE UP (ref 30–115)
ALT FLD-CCNC: 22 U/L — SIGNIFICANT CHANGE UP (ref 0–41)
ALT FLD-CCNC: 22 U/L — SIGNIFICANT CHANGE UP (ref 0–41)
ANION GAP SERPL CALC-SCNC: 10 MMOL/L — SIGNIFICANT CHANGE UP (ref 7–14)
ANION GAP SERPL CALC-SCNC: 10 MMOL/L — SIGNIFICANT CHANGE UP (ref 7–14)
APPEARANCE UR: CLEAR — SIGNIFICANT CHANGE UP
APPEARANCE UR: CLEAR — SIGNIFICANT CHANGE UP
AST SERPL-CCNC: 20 U/L — SIGNIFICANT CHANGE UP (ref 0–41)
AST SERPL-CCNC: 20 U/L — SIGNIFICANT CHANGE UP (ref 0–41)
BASOPHILS # BLD AUTO: 0.02 K/UL — SIGNIFICANT CHANGE UP (ref 0–0.2)
BASOPHILS # BLD AUTO: 0.02 K/UL — SIGNIFICANT CHANGE UP (ref 0–0.2)
BASOPHILS NFR BLD AUTO: 0.3 % — SIGNIFICANT CHANGE UP (ref 0–1)
BASOPHILS NFR BLD AUTO: 0.3 % — SIGNIFICANT CHANGE UP (ref 0–1)
BILIRUB SERPL-MCNC: 0.4 MG/DL — SIGNIFICANT CHANGE UP (ref 0.2–1.2)
BILIRUB SERPL-MCNC: 0.4 MG/DL — SIGNIFICANT CHANGE UP (ref 0.2–1.2)
BILIRUB UR-MCNC: NEGATIVE — SIGNIFICANT CHANGE UP
BILIRUB UR-MCNC: NEGATIVE — SIGNIFICANT CHANGE UP
BUN SERPL-MCNC: 17 MG/DL — SIGNIFICANT CHANGE UP (ref 10–20)
BUN SERPL-MCNC: 17 MG/DL — SIGNIFICANT CHANGE UP (ref 10–20)
CALCIUM SERPL-MCNC: 9.9 MG/DL — SIGNIFICANT CHANGE UP (ref 8.4–10.5)
CALCIUM SERPL-MCNC: 9.9 MG/DL — SIGNIFICANT CHANGE UP (ref 8.4–10.5)
CHLORIDE SERPL-SCNC: 103 MMOL/L — SIGNIFICANT CHANGE UP (ref 98–110)
CHLORIDE SERPL-SCNC: 103 MMOL/L — SIGNIFICANT CHANGE UP (ref 98–110)
CO2 SERPL-SCNC: 25 MMOL/L — SIGNIFICANT CHANGE UP (ref 17–32)
CO2 SERPL-SCNC: 25 MMOL/L — SIGNIFICANT CHANGE UP (ref 17–32)
COLOR SPEC: YELLOW — SIGNIFICANT CHANGE UP
COLOR SPEC: YELLOW — SIGNIFICANT CHANGE UP
CREAT SERPL-MCNC: 0.9 MG/DL — SIGNIFICANT CHANGE UP (ref 0.7–1.5)
CREAT SERPL-MCNC: 0.9 MG/DL — SIGNIFICANT CHANGE UP (ref 0.7–1.5)
DIFF PNL FLD: NEGATIVE — SIGNIFICANT CHANGE UP
DIFF PNL FLD: NEGATIVE — SIGNIFICANT CHANGE UP
EGFR: 69 ML/MIN/1.73M2 — SIGNIFICANT CHANGE UP
EGFR: 69 ML/MIN/1.73M2 — SIGNIFICANT CHANGE UP
EOSINOPHIL # BLD AUTO: 0.05 K/UL — SIGNIFICANT CHANGE UP (ref 0–0.7)
EOSINOPHIL # BLD AUTO: 0.05 K/UL — SIGNIFICANT CHANGE UP (ref 0–0.7)
EOSINOPHIL NFR BLD AUTO: 0.9 % — SIGNIFICANT CHANGE UP (ref 0–8)
EOSINOPHIL NFR BLD AUTO: 0.9 % — SIGNIFICANT CHANGE UP (ref 0–8)
GLUCOSE SERPL-MCNC: 110 MG/DL — HIGH (ref 70–99)
GLUCOSE SERPL-MCNC: 110 MG/DL — HIGH (ref 70–99)
GLUCOSE UR QL: NEGATIVE MG/DL — SIGNIFICANT CHANGE UP
GLUCOSE UR QL: NEGATIVE MG/DL — SIGNIFICANT CHANGE UP
HCT VFR BLD CALC: 43.8 % — SIGNIFICANT CHANGE UP (ref 37–47)
HCT VFR BLD CALC: 43.8 % — SIGNIFICANT CHANGE UP (ref 37–47)
HGB BLD-MCNC: 14.3 G/DL — SIGNIFICANT CHANGE UP (ref 12–16)
HGB BLD-MCNC: 14.3 G/DL — SIGNIFICANT CHANGE UP (ref 12–16)
IMM GRANULOCYTES NFR BLD AUTO: 0.2 % — SIGNIFICANT CHANGE UP (ref 0.1–0.3)
IMM GRANULOCYTES NFR BLD AUTO: 0.2 % — SIGNIFICANT CHANGE UP (ref 0.1–0.3)
KETONES UR-MCNC: NEGATIVE MG/DL — SIGNIFICANT CHANGE UP
KETONES UR-MCNC: NEGATIVE MG/DL — SIGNIFICANT CHANGE UP
LEUKOCYTE ESTERASE UR-ACNC: NEGATIVE — SIGNIFICANT CHANGE UP
LEUKOCYTE ESTERASE UR-ACNC: NEGATIVE — SIGNIFICANT CHANGE UP
LYMPHOCYTES # BLD AUTO: 1.29 K/UL — SIGNIFICANT CHANGE UP (ref 1.2–3.4)
LYMPHOCYTES # BLD AUTO: 1.29 K/UL — SIGNIFICANT CHANGE UP (ref 1.2–3.4)
LYMPHOCYTES # BLD AUTO: 22.2 % — SIGNIFICANT CHANGE UP (ref 20.5–51.1)
LYMPHOCYTES # BLD AUTO: 22.2 % — SIGNIFICANT CHANGE UP (ref 20.5–51.1)
MAGNESIUM SERPL-MCNC: 2 MG/DL — SIGNIFICANT CHANGE UP (ref 1.8–2.4)
MAGNESIUM SERPL-MCNC: 2 MG/DL — SIGNIFICANT CHANGE UP (ref 1.8–2.4)
MCHC RBC-ENTMCNC: 28 PG — SIGNIFICANT CHANGE UP (ref 27–31)
MCHC RBC-ENTMCNC: 28 PG — SIGNIFICANT CHANGE UP (ref 27–31)
MCHC RBC-ENTMCNC: 32.6 G/DL — SIGNIFICANT CHANGE UP (ref 32–37)
MCHC RBC-ENTMCNC: 32.6 G/DL — SIGNIFICANT CHANGE UP (ref 32–37)
MCV RBC AUTO: 85.9 FL — SIGNIFICANT CHANGE UP (ref 81–99)
MCV RBC AUTO: 85.9 FL — SIGNIFICANT CHANGE UP (ref 81–99)
MONOCYTES # BLD AUTO: 0.32 K/UL — SIGNIFICANT CHANGE UP (ref 0.1–0.6)
MONOCYTES # BLD AUTO: 0.32 K/UL — SIGNIFICANT CHANGE UP (ref 0.1–0.6)
MONOCYTES NFR BLD AUTO: 5.5 % — SIGNIFICANT CHANGE UP (ref 1.7–9.3)
MONOCYTES NFR BLD AUTO: 5.5 % — SIGNIFICANT CHANGE UP (ref 1.7–9.3)
NEUTROPHILS # BLD AUTO: 4.12 K/UL — SIGNIFICANT CHANGE UP (ref 1.4–6.5)
NEUTROPHILS # BLD AUTO: 4.12 K/UL — SIGNIFICANT CHANGE UP (ref 1.4–6.5)
NEUTROPHILS NFR BLD AUTO: 70.9 % — SIGNIFICANT CHANGE UP (ref 42.2–75.2)
NEUTROPHILS NFR BLD AUTO: 70.9 % — SIGNIFICANT CHANGE UP (ref 42.2–75.2)
NITRITE UR-MCNC: NEGATIVE — SIGNIFICANT CHANGE UP
NITRITE UR-MCNC: NEGATIVE — SIGNIFICANT CHANGE UP
NRBC # BLD: 0 /100 WBCS — SIGNIFICANT CHANGE UP (ref 0–0)
NRBC # BLD: 0 /100 WBCS — SIGNIFICANT CHANGE UP (ref 0–0)
PH UR: 6 — SIGNIFICANT CHANGE UP (ref 5–8)
PH UR: 6 — SIGNIFICANT CHANGE UP (ref 5–8)
PLATELET # BLD AUTO: 206 K/UL — SIGNIFICANT CHANGE UP (ref 130–400)
PLATELET # BLD AUTO: 206 K/UL — SIGNIFICANT CHANGE UP (ref 130–400)
PMV BLD: 10.3 FL — SIGNIFICANT CHANGE UP (ref 7.4–10.4)
PMV BLD: 10.3 FL — SIGNIFICANT CHANGE UP (ref 7.4–10.4)
POTASSIUM SERPL-MCNC: 4.7 MMOL/L — SIGNIFICANT CHANGE UP (ref 3.5–5)
POTASSIUM SERPL-MCNC: 4.7 MMOL/L — SIGNIFICANT CHANGE UP (ref 3.5–5)
POTASSIUM SERPL-SCNC: 4.7 MMOL/L — SIGNIFICANT CHANGE UP (ref 3.5–5)
POTASSIUM SERPL-SCNC: 4.7 MMOL/L — SIGNIFICANT CHANGE UP (ref 3.5–5)
PROT SERPL-MCNC: 7 G/DL — SIGNIFICANT CHANGE UP (ref 6–8)
PROT SERPL-MCNC: 7 G/DL — SIGNIFICANT CHANGE UP (ref 6–8)
PROT UR-MCNC: NEGATIVE MG/DL — SIGNIFICANT CHANGE UP
PROT UR-MCNC: NEGATIVE MG/DL — SIGNIFICANT CHANGE UP
RBC # BLD: 5.1 M/UL — SIGNIFICANT CHANGE UP (ref 4.2–5.4)
RBC # BLD: 5.1 M/UL — SIGNIFICANT CHANGE UP (ref 4.2–5.4)
RBC # FLD: 13.4 % — SIGNIFICANT CHANGE UP (ref 11.5–14.5)
RBC # FLD: 13.4 % — SIGNIFICANT CHANGE UP (ref 11.5–14.5)
SODIUM SERPL-SCNC: 138 MMOL/L — SIGNIFICANT CHANGE UP (ref 135–146)
SODIUM SERPL-SCNC: 138 MMOL/L — SIGNIFICANT CHANGE UP (ref 135–146)
SP GR SPEC: 1.02 — SIGNIFICANT CHANGE UP (ref 1–1.03)
SP GR SPEC: 1.02 — SIGNIFICANT CHANGE UP (ref 1–1.03)
TROPONIN T SERPL-MCNC: <0.01 NG/ML — SIGNIFICANT CHANGE UP
UROBILINOGEN FLD QL: 0.2 MG/DL — SIGNIFICANT CHANGE UP (ref 0.2–1)
UROBILINOGEN FLD QL: 0.2 MG/DL — SIGNIFICANT CHANGE UP (ref 0.2–1)
WBC # BLD: 5.81 K/UL — SIGNIFICANT CHANGE UP (ref 4.8–10.8)
WBC # BLD: 5.81 K/UL — SIGNIFICANT CHANGE UP (ref 4.8–10.8)
WBC # FLD AUTO: 5.81 K/UL — SIGNIFICANT CHANGE UP (ref 4.8–10.8)
WBC # FLD AUTO: 5.81 K/UL — SIGNIFICANT CHANGE UP (ref 4.8–10.8)

## 2023-12-08 PROCEDURE — 85025 COMPLETE CBC W/AUTO DIFF WBC: CPT

## 2023-12-08 PROCEDURE — 36415 COLL VENOUS BLD VENIPUNCTURE: CPT

## 2023-12-08 PROCEDURE — 99285 EMERGENCY DEPT VISIT HI MDM: CPT | Mod: 25

## 2023-12-08 PROCEDURE — 93010 ELECTROCARDIOGRAM REPORT: CPT

## 2023-12-08 PROCEDURE — 80053 COMPREHEN METABOLIC PANEL: CPT

## 2023-12-08 PROCEDURE — 84484 ASSAY OF TROPONIN QUANT: CPT

## 2023-12-08 PROCEDURE — 99284 EMERGENCY DEPT VISIT MOD MDM: CPT | Mod: GC

## 2023-12-08 PROCEDURE — 71045 X-RAY EXAM CHEST 1 VIEW: CPT

## 2023-12-08 PROCEDURE — 71045 X-RAY EXAM CHEST 1 VIEW: CPT | Mod: 26

## 2023-12-08 PROCEDURE — 81003 URINALYSIS AUTO W/O SCOPE: CPT

## 2023-12-08 PROCEDURE — 93005 ELECTROCARDIOGRAM TRACING: CPT

## 2023-12-08 PROCEDURE — 83735 ASSAY OF MAGNESIUM: CPT

## 2023-12-08 NOTE — ED PROVIDER NOTE - NSFOLLOWUPINSTRUCTIONS_ED_ALL_ED_FT
Chest Pain    You were seen and evaluated for chest pain. After a work up in the Emergency Department, it was determined that it is safe for you to be discharged with close follow up. Please monitor your symptoms and bring all of your results to follow up with your doctor. Please call for follow up with the heart doctor. As discussed, you may require further work up and testing for your chest pain.    WHAT YOU NEED TO KNOW:  Chest pain can be caused by a range of conditions, from not serious to life-threatening. Chest pain can be a symptom of a digestive problem, such as acid reflux or a stomach ulcer. An anxiety attack or a strong emotion, such as anger, can also cause chest pain. Infection, inflammation, or a fracture in the bones or cartilage in your chest can cause pain or discomfort. Sometimes chest pain or pressure is caused by poor blood flow to your heart (angina). Chest pain may also be caused by life-threatening conditions such as a heart attack or blood clot in your lungs.      DISCHARGE INSTRUCTIONS:  Call your local emergency number (911 in the [place]) or have someone call if:  You have any of the following signs of a heart attack:  Squeezing, pressure, or pain in your chest  You may also have any of the following:  Discomfort or pain in your back, neck, jaw, stomach, or arm  Shortness of breath  Nausea or vomiting  Lightheadedness or a sudden cold sweat  Return to the emergency department if:  You have chest discomfort that gets worse, even with medicine.  You cough or vomit blood.  Your bowel movements are black or bloody.  You cannot stop vomiting, or it hurts to swallow.  Call your doctor if:  You have questions or concerns about your condition or care.  Medicines:  Medicines may be given to treat the cause of your chest pain. Examples include pain medicine, anxiety medicine, or medicines to increase blood flow to your heart.  Do not take certain medicines without asking your healthcare provider first. These include NSAIDs, herbal or vitamin supplements, or hormones (estrogen or progestin).  Take your medicine as directed. Contact your healthcare provider if you think your medicine is not helping or if you have side effects. Tell him or her if you are allergic to any medicine. Keep a list of the medicines, vitamins, and herbs you take. Include the amounts, and when and why you take them. Bring the list or the pill bottles to follow-up visits. Carry your medicine list with you in case of an emergency.    Healthy living tips:  The following are general healthy guidelines. If the cause of your chest pain is known, your healthcare provider will give you specific guidelines to follow.    Do not smoke. Nicotine and other chemicals in cigarettes and cigars can cause lung and heart damage. Ask your healthcare provider for information if you currently smoke and need help to quit. E-cigarettes or smokeless tobacco still contain nicotine. Talk to your healthcare provider before you use these products.  Choose a variety of healthy foods as often as possible. Include fresh, frozen, or canned fruits and vegetables. Also include low-fat dairy products, fish, chicken (without skin), and lean meats. Your healthcare provider or a dietitian can help you create meal plans. You may need to avoid certain foods or drinks if your pain is caused by a digestion problem.  Healthy Foods  Lower your sodium (salt) intake. Limit foods that are high in sodium, such as canned foods, salty snacks, and cold cuts. If you add salt when you cook food, do not add more at the table. Choose low-sodium canned foods as much as possible.    Drink plenty of water every day. Water helps your body to control your temperature and blood pressure. Ask your healthcare provider how much water you should drink every day.  Ask about activity. Your healthcare provider will tell you which activities to limit or avoid. Ask when you can drive, return to work, and have sex. Ask about the best exercise plan for you.  Maintain a healthy weight. Ask your healthcare provider what a healthy weight is for you. Ask him or her to help you create a safe weight loss plan if you are overweight.    Follow up with your healthcare provider within 72 hours, or as directed:  You may need to return for more tests to find the cause of your chest pain. You may be referred to a specialist, such as a cardiologist or gastroenterologist. Write down your questions so you remember to ask them during your visits.

## 2023-12-08 NOTE — ED ADULT NURSE NOTE - NSFALLRISKINTERV_ED_ALL_ED
Communicate fall risk and risk factors to all staff, patient, and family/Provide visual cue: yellow wristband, yellow gown, etc/Reinforce activity limits and safety measures with patient and family/Call bell, personal items and telephone in reach/Instruct patient to call for assistance before getting out of bed/chair/stretcher/Non-slip footwear applied when patient is off stretcher/[place] to call system/Physically safe environment - no spills, clutter or unnecessary equipment/Purposeful Proactive Rounding/Room/bathroom lighting operational, light cord in reach Communicate fall risk and risk factors to all staff, patient, and family/Provide visual cue: yellow wristband, yellow gown, etc/Reinforce activity limits and safety measures with patient and family/Call bell, personal items and telephone in reach/Instruct patient to call for assistance before getting out of bed/chair/stretcher/Non-slip footwear applied when patient is off stretcher/[place] to call system/Physically safe environment - no spills, clutter or unnecessary equipment/Purposeful Proactive Rounding/Room/bathroom lighting operational, light cord in reach

## 2023-12-08 NOTE — ED PROVIDER NOTE - PHYSICAL EXAMINATION
Vital Signs: I have reviewed the initial vital signs.  Constitutional: well-nourished, appears stated age, no acute distress.  HEENT: Airway patent, moist MM, no erythema/swelling/deformity of oral structures. EOMI, PERRLA.  CV: regular rate, regular rhythm, well-perfused extremities,   Lungs: Clear to ascultation bilaterally, no increased work of breathing.  ABD: Non-tender, Non-distended,  MSK: Neck supple, nontender, normal range of motion, Back nontender   INTEG: Skin warm, dry, no rash.  NEURO: A&Ox3, moving all extremities, normal speech  PSYCH: Calm, cooperative, normal affect and interaction.

## 2023-12-08 NOTE — ED ADULT NURSE NOTE - OBJECTIVE STATEMENT
Pt c/o of b/l upper arm- bicep pain that started yesterday. Pt worried it was a cardiac related issue. Pt A+Ox4 and ambulatory, has extensive medical hx. Mom has cardiac hx.

## 2023-12-08 NOTE — ED ADULT NURSE NOTE - NSFALLLASTDATE_ED_ALL_ED_DT
08-Dec-2023 10:28 Azithromycin Counseling:  I discussed with the patient the risks of azithromycin including but not limited to GI upset, allergic reaction, drug rash, diarrhea, and yeast infections.

## 2023-12-08 NOTE — ED PROVIDER NOTE - OBJECTIVE STATEMENT
67-year-old female history of hypertension, hyperlipidemia, iron deficiency anemia presenting for evaluation of bilateral bicep pain.  Patient also states her blood pressures have been increasing over the past week, normally is 150/80.  Patient denies chest pain, shortness of breath, dizziness, lightheadedness, blurry vision patient was also anxious as her mother had an MI 1 year ago and her presenting symptom at that time was bilateral arm pain.  No trauma, no weakness, no numbness. Last stress test 1 year ago.

## 2023-12-08 NOTE — ED PROVIDER NOTE - PATIENT PORTAL LINK FT
You can access the FollowMyHealth Patient Portal offered by [facility] by registering at the following website: [email or web address]. By joining [employer]’s FollowMyHealth portal, you will also be able to view your health information using other applications (apps) compatible with our system. You can access the FollowMyHealth Patient Portal offered by [facility] by registering at the following website: [email or web address]. By joining [employer]’s FollowMyHealth portal, you will also be able to view your health information using other applications (apps) compatible with our system.

## 2023-12-08 NOTE — ED PROVIDER NOTE - ATTENDING CONTRIBUTION TO CARE
67-year-old female history of hypertension, hyperlipidemia, iron deficiency anemia presenting for evaluation of bilateral bicep pain.  no exertional or pleuritic component.  no trauma.  pt was also concerned because she noted her BP was higher than normal. no chest pain, ap, n,v,diaphoresis. pt states mother had MI 1 year ago and had similar sx. last stress 1 yr ago and negative    vss  gen- NAD, aaox3  card-rrr  lungs-ctab, no wheezing or rhonchi  abd-sntnd, no guarding or rebound  neuro- full str/sensation, cn ii-xii grossly intact, normal coordination and gait  msk- no long bone tenderness or joint swelling, no skin changes to shoulders /upper arms/elbows/forearms, no deformity, no ecchymosis

## 2023-12-08 NOTE — ED PROVIDER NOTE - CLINICAL SUMMARY MEDICAL DECISION MAKING FREE TEXT BOX
Throughout ED observation period, pt remained clinically and hemodynamically stable.  w/u without acute findings  ekg w/ acute ischaemic changes  trops neg x2  likely msk but atypical acs ruled out  advised pcp f/u and return precautions

## 2023-12-12 ENCOUNTER — APPOINTMENT (OUTPATIENT)
Dept: HEMATOLOGY ONCOLOGY | Facility: CLINIC | Age: 68
End: 2023-12-12

## 2023-12-12 ENCOUNTER — LABORATORY RESULT (OUTPATIENT)
Age: 68
End: 2023-12-12

## 2023-12-12 ENCOUNTER — OUTPATIENT (OUTPATIENT)
Dept: OUTPATIENT SERVICES | Facility: HOSPITAL | Age: 68
LOS: 1 days | End: 2023-12-12
Payer: MEDICARE

## 2023-12-12 DIAGNOSIS — E61.1 IRON DEFICIENCY: ICD-10-CM

## 2023-12-12 DIAGNOSIS — Z90.49 ACQUIRED ABSENCE OF OTHER SPECIFIED PARTS OF DIGESTIVE TRACT: Chronic | ICD-10-CM

## 2023-12-12 LAB
ALBUMIN SERPL ELPH-MCNC: 4.3 G/DL
ALP BLD-CCNC: 109 U/L
ALT SERPL-CCNC: 23 U/L
AST SERPL-CCNC: 18 U/L
BILIRUB DIRECT SERPL-MCNC: <0.2 MG/DL
BILIRUB INDIRECT SERPL-MCNC: >0.2 MG/DL
BILIRUB SERPL-MCNC: 0.4 MG/DL
HCT VFR BLD CALC: 42.7 %
HGB BLD-MCNC: 14 G/DL
IRON SATN MFR SERPL: 35 %
IRON SERPL-MCNC: 80 UG/DL
MAGNESIUM SERPL-MCNC: 2.1 MG/DL
MCHC RBC-ENTMCNC: 28.1 PG
MCHC RBC-ENTMCNC: 32.8 G/DL
MCV RBC AUTO: 85.6 FL
PLATELET # BLD AUTO: 184 K/UL
PMV BLD: 9.8 FL
PROT SERPL-MCNC: 6.9 G/DL
RBC # BLD: 4.99 M/UL
RBC # FLD: 13.8 %
TIBC SERPL-MCNC: 231 UG/DL
UIBC SERPL-MCNC: 151 UG/DL
WBC # FLD AUTO: 5.98 K/UL

## 2023-12-12 PROCEDURE — 82607 VITAMIN B-12: CPT

## 2023-12-12 PROCEDURE — 36415 COLL VENOUS BLD VENIPUNCTURE: CPT

## 2023-12-12 PROCEDURE — 80048 BASIC METABOLIC PNL TOTAL CA: CPT

## 2023-12-12 PROCEDURE — 83735 ASSAY OF MAGNESIUM: CPT

## 2023-12-12 PROCEDURE — 85027 COMPLETE CBC AUTOMATED: CPT

## 2023-12-12 PROCEDURE — 83540 ASSAY OF IRON: CPT

## 2023-12-12 PROCEDURE — 80076 HEPATIC FUNCTION PANEL: CPT

## 2023-12-12 PROCEDURE — 82728 ASSAY OF FERRITIN: CPT

## 2023-12-12 PROCEDURE — 83550 IRON BINDING TEST: CPT

## 2023-12-12 PROCEDURE — 82746 ASSAY OF FOLIC ACID SERUM: CPT

## 2023-12-13 LAB
ANION GAP SERPL CALC-SCNC: 11 MMOL/L
BUN SERPL-MCNC: 19 MG/DL
CALCIUM SERPL-MCNC: 9.7 MG/DL
CHLORIDE SERPL-SCNC: 103 MMOL/L
CO2 SERPL-SCNC: 26 MMOL/L
CREAT SERPL-MCNC: 1.1 MG/DL
EGFR: 54 ML/MIN/1.73M2
FERRITIN SERPL-MCNC: 438 NG/ML
FOLATE SERPL-MCNC: >20 NG/ML
GLUCOSE SERPL-MCNC: 89 MG/DL
POTASSIUM SERPL-SCNC: 4.7 MMOL/L
SODIUM SERPL-SCNC: 140 MMOL/L
VIT B12 SERPL-MCNC: 1068 PG/ML

## 2023-12-19 ENCOUNTER — OUTPATIENT (OUTPATIENT)
Dept: OUTPATIENT SERVICES | Facility: HOSPITAL | Age: 68
LOS: 1 days | End: 2023-12-19
Payer: MEDICARE

## 2023-12-19 ENCOUNTER — APPOINTMENT (OUTPATIENT)
Dept: HEMATOLOGY ONCOLOGY | Facility: CLINIC | Age: 68
End: 2023-12-19
Payer: MEDICARE

## 2023-12-19 VITALS — DIASTOLIC BLOOD PRESSURE: 60 MMHG | SYSTOLIC BLOOD PRESSURE: 171 MMHG

## 2023-12-19 VITALS
SYSTOLIC BLOOD PRESSURE: 195 MMHG | BODY MASS INDEX: 26.5 KG/M2 | WEIGHT: 144 LBS | HEART RATE: 60 BPM | RESPIRATION RATE: 16 BRPM | DIASTOLIC BLOOD PRESSURE: 82 MMHG | HEIGHT: 62 IN | TEMPERATURE: 97.9 F

## 2023-12-19 DIAGNOSIS — E61.1 IRON DEFICIENCY: ICD-10-CM

## 2023-12-19 DIAGNOSIS — Z80.0 FAMILY HISTORY OF MALIGNANT NEOPLASM OF DIGESTIVE ORGANS: ICD-10-CM

## 2023-12-19 DIAGNOSIS — K21.9 GASTRO-ESOPHAGEAL REFLUX DISEASE W/OUT ESOPHAGITIS: ICD-10-CM

## 2023-12-19 DIAGNOSIS — I10 ESSENTIAL (PRIMARY) HYPERTENSION: ICD-10-CM

## 2023-12-19 DIAGNOSIS — E78.5 HYPERLIPIDEMIA, UNSPECIFIED: ICD-10-CM

## 2023-12-19 DIAGNOSIS — Z90.49 ACQUIRED ABSENCE OF OTHER SPECIFIED PARTS OF DIGESTIVE TRACT: Chronic | ICD-10-CM

## 2023-12-19 PROCEDURE — 99214 OFFICE O/P EST MOD 30 MIN: CPT

## 2023-12-19 RX ORDER — CEFUROXIME AXETIL 500 MG/1
500 TABLET ORAL
Refills: 0 | Status: DISCONTINUED | COMMUNITY
End: 2023-12-19

## 2023-12-19 RX ORDER — METOPROLOL SUCCINATE 100 MG/1
100 TABLET, EXTENDED RELEASE ORAL
Refills: 0 | Status: ACTIVE | COMMUNITY

## 2023-12-19 RX ORDER — LOSARTAN POTASSIUM 50 MG/1
50 TABLET, FILM COATED ORAL
Refills: 0 | Status: ACTIVE | COMMUNITY

## 2023-12-19 RX ORDER — PREDNISONE 5 MG/1
5 TABLET ORAL DAILY
Qty: 30 | Refills: 0 | Status: DISCONTINUED | COMMUNITY
End: 2023-12-19

## 2023-12-19 RX ORDER — PANTOPRAZOLE 40 MG/1
40 TABLET, DELAYED RELEASE ORAL
Refills: 0 | Status: ACTIVE | COMMUNITY

## 2023-12-19 RX ORDER — ATORVASTATIN CALCIUM 20 MG/1
20 TABLET, FILM COATED ORAL
Refills: 0 | Status: ACTIVE | COMMUNITY

## 2023-12-19 NOTE — CONSULT LETTER
[Dear  ___] : Dear  [unfilled], [Consult Letter:] : I had the pleasure of evaluating your patient, [unfilled]. [Please see my note below.] : Please see my note below. [Sincerely,] : Sincerely, [FreeTextEntry3] : Desean Madison DO\par  Attending Physician,\par  Hematology/ Medical Oncology\par  [phone] office\par  \par

## 2023-12-19 NOTE — HISTORY OF PRESENT ILLNESS
[de-identified] : Ms. AMELIA JOHNSON is a 67 year old female here today for evaluation and management of anemia.    AMELIA is a 67 year old F with PMHx including Polymyalgia rheumatica, pleural effusion, HTN, hyperlipidemia, who presents to clinic to establish care.   She states she has not had anemia prior to this occasion.  She was taking MTX and prednisone for PMR but stopped a few days ago.  She follows with [facility], DR. Claros.  She reports SOB with exertion.  Last GYN exam was in June 2022.  She reports unintentional weight loss over the last year as well as occasional night sweats.  Patient denies CP, palpitations, dizziness, dyspnea at rest, hematuria or overt bleeding.  Patient reports no known family history of malignancy or blood/clotting disorder.  Former smoker, quit in 1998.    LAB WORKUP (5.15.2023 - [facility]) WBC 4.7, Hgb 8.7, MCV 86.7, RDW 13.9,  (5.10.2023 - [facility]) WBC 4.1, Hgb 8.6, MCV 87.7, RDW 13.9,  (4.26.2023 - [facility]) WBC 6.9, Hgb 8.5, MCV 86.4, RDW 14,  (4.22.2023 - [facility]) WBC 5.5, Hgb 8.8, MCV 88, RDW 14, , ironsat 11%, TIBC 228, ferritin 221, Vitamin B12 is 230, Folate >24, Cr 1.35, eGFR 43, normal LFTs  HCM Colonoscopy done in 01/2023 with Dr. Meadows which was reportedly normal Upper Endoscopy done in 05/2023 with Dr. Meadows which was reportedly normal  GYN Pelvic Exam/pap overdue  [de-identified] : 7/18/23 Patient is here for a follow-up visit for anemia.  She is feeling well but endorses fatigue.  She completed IV venofer in 07/2023.  Last CBC showed hgb down to 7.7g/dL so she was sent to ER and received 1 unit PRBC.  Patient states she developed RUQ discomfort last week and went to [facility] where they performed CT imaging and further workup including urine testing which showed blood.  She is now on oral abx (Cefuroxime) for suspected UTI.  Patient continues to take oral Vitamin B12 and folic acid once daily.  Reviewed most recent CBC, which shows hgb up to 10.1g/dL.  Patient denies CP, palpitations, dizziness, dyspnea, hematuria or PRBRB.    8/22/23 Patient is here for a follow-up visit for anemia.  She is feeling well compared to last month.  Since last visit, patient went to the hospital with complaint of fatigue, dyspnea and pain with inspiration.  Reviewed most recent CBC which shows hgb up to 10.9g/dL; ferritin 1481, ironsat 39%, TIBC 224.  Patient continues to take oral Vitamin B12 and folic acid once daily.  Patient denies CP, palpitations, dizziness, dyspnea, hematuria or PRBRB.  Reviewed most recent CT imaging from hospital which shows small bilateral pleural effusion and small pericardial effusion.  She was started on prednisone 60mg for 1 week due to suspicion that these findings were due to autoimmune etiology.  She recently followed with [facility] who supplied short term supply of prednisone 5mg due to concern regarding adrenal insufficiency.   CTa Chest (7.24.2023) IMPRESSION:1.  No central or segmental pulmonary embolus.2.  Bilateral pleural effusions with adjacent atelectasis.3.  Small pericardial effusion. CT A/P (7.25.2023)IMPRESSION:1. No evidence of abdominopelvic neoplastic disease.2. Small pericardial effusion. Small bilateral pleural effusions.  12/19/23 Patient is here for a follow-up visit for multifactorial anemia.  Reviewed most recent CBC which shows hgb up to 14g/dL; ferritin 438, ironsat 35%, TIBC 231.  Patient continues to take oral Vitamin B12 and folic acid once daily.  Patient denies CP, palpitations, dizziness, dyspnea, hematuria, black stool or PRBRB.  Weight is improving, appetite is good now.  She had left breast biopsy performed last week at [facility] and is awaiting results.

## 2023-12-19 NOTE — ASSESSMENT
[FreeTextEntry1] : # Anemia, normocytic ; vitamin B12 deficiency + iron deficiency - s/p Colonoscopy done in 01/2023 with Dr. Meadows which was reportedly normal ; requested patient to obtain results for our review - s/p Upper Endoscopy done in 05/2023 with Dr. Meadows which was reportedly normal ; requested patient to obtain results for our review - followup with GI as recommended to discuss role of VCE  - followup with GYN as scheduled this month for complete pelvic examination  - s/p IV Venofer 200mg x 5 , completed in 07/2023 - CT C/A/P with IVC 07/2023 shows small bilateral pleural effusion and small pericardial effusion - no additional IV iron needed at this time, hgb up to 14g/dL  - c/w Vitamin B12 1000mcg by mouth daily, Rx renewed  - c/w Folic Acid 1mg by mouth daily , Rx renewed   Requested to obtain copy of mammogram and pathology from Left breast biopsy performed at [facility] last week.  Encouraged to keep up to date with age appropriate screenings including GYN pelvic exam.   RTC 3 months with CBC, BMP, LFTs, iron studies, ferritin, MMA level 2 days prior  seen/ examined w/NP Diane; note reviewed; case discussed; agree w/ above

## 2024-04-18 ENCOUNTER — EMERGENCY (EMERGENCY)
Facility: HOSPITAL | Age: 69
LOS: 0 days | Discharge: HOME | End: 2024-04-18
Attending: EMERGENCY MEDICINE
Payer: MEDICARE

## 2024-04-18 VITALS
TEMPERATURE: 98 F | DIASTOLIC BLOOD PRESSURE: 85 MMHG | RESPIRATION RATE: 18 BRPM | OXYGEN SATURATION: 100 % | SYSTOLIC BLOOD PRESSURE: 197 MMHG | HEART RATE: 76 BPM

## 2024-04-18 VITALS
OXYGEN SATURATION: 100 % | WEIGHT: 154.98 LBS | RESPIRATION RATE: 18 BRPM | SYSTOLIC BLOOD PRESSURE: 223 MMHG | HEIGHT: 62 IN | HEART RATE: 84 BPM | TEMPERATURE: 98 F | DIASTOLIC BLOOD PRESSURE: 98 MMHG

## 2024-04-18 DIAGNOSIS — M25.472 EFFUSION, LEFT ANKLE: ICD-10-CM

## 2024-04-18 DIAGNOSIS — Z90.49 ACQUIRED ABSENCE OF OTHER SPECIFIED PARTS OF DIGESTIVE TRACT: Chronic | ICD-10-CM

## 2024-04-18 DIAGNOSIS — I10 ESSENTIAL (PRIMARY) HYPERTENSION: ICD-10-CM

## 2024-04-18 DIAGNOSIS — Z88.5 ALLERGY STATUS TO NARCOTIC AGENT: ICD-10-CM

## 2024-04-18 DIAGNOSIS — M25.572 PAIN IN LEFT ANKLE AND JOINTS OF LEFT FOOT: ICD-10-CM

## 2024-04-18 PROCEDURE — 93970 EXTREMITY STUDY: CPT | Mod: 26

## 2024-04-18 PROCEDURE — 73610 X-RAY EXAM OF ANKLE: CPT | Mod: LT

## 2024-04-18 PROCEDURE — 73610 X-RAY EXAM OF ANKLE: CPT | Mod: 26,LT

## 2024-04-18 PROCEDURE — 93970 EXTREMITY STUDY: CPT

## 2024-04-18 PROCEDURE — 99284 EMERGENCY DEPT VISIT MOD MDM: CPT | Mod: 25

## 2024-04-18 PROCEDURE — 99284 EMERGENCY DEPT VISIT MOD MDM: CPT | Mod: FS

## 2024-04-18 NOTE — ED PROVIDER NOTE - PROGRESS NOTE DETAILS
vasc duplex prelim negative.   results discussed with patient, patient pain free. fu instructions and return precautions discussed. patient verbalizes understanding and is agreeable with plan.

## 2024-04-18 NOTE — ED PROVIDER NOTE - CARE PROVIDER_API CALL
Edwar Blunt  Orthopaedic Surgery  [address] [place] [address]  Phone: [phone]  Fax: [phone]  Follow Up Time: 4-6 Days

## 2024-04-18 NOTE — ED PROVIDER NOTE - PATIENT PORTAL LINK FT
You can access the FollowMyHealth Patient Portal offered by [facility] by registering at the following website: [email or web address]. By joining [employer]’s FollowMyHealth portal, you will also be able to view your health information using other applications (apps) compatible with our system.

## 2024-04-18 NOTE — ED PROVIDER NOTE - NSFOLLOWUPINSTRUCTIONS_ED_ALL_ED_FT
Please follow up with your primary care doctor in 1-3 days  Please be aware of any new or worsening signs or symptoms that should prompt your return to the ER.      Ankle Pain    Many things can cause ankle pain, including an injury to the area and overuse of the ankle. The ankle joint holds your body weight and allows you to move around. Ankle pain can occur on either side or the back of one ankle or both ankles. Ankle pain may be sharp and burning or dull and aching. There may be tenderness, stiffness, redness, or warmth around the ankle.     HOME CARE INSTRUCTIONS  Activity    Rest your ankle as told by your health care provider. Avoid any activities that cause ankle pain.  Do exercises as told by your health care provider.  Ask your health care provider if you can drive.    Using a Brace, a Bandage, or Crutches    If you were given a brace:  Wear it as told by your health care provider.  Remove it when you take a bath or a shower.  Try not to move your ankle very much, but wiggle your toes from time to time. This helps to prevent swelling.  If you were given an elastic bandage:  Remove it when you take a bath or a shower.  Try not to move your ankle very much, but wiggle your toes from time to time. This helps to prevent swelling.  Adjust the bandage to make it more comfortable if it feels too tight.  Loosen the bandage if you have numbness or tingling in your foot or if your foot turns cold and blue.  If you have crutches, use them as told by your health care provider. Continue to use them until you can walk without feeling pain in your ankle.    Managing Pain, Stiffness, and Swelling    Raise (elevate) your ankle above the level of your heart while you are sitting or lying down.  If directed, apply ice to the area:  Put ice in a plastic bag.  Place a towel between your skin and the bag.  Leave the ice on for 20 minutes, 2–3 times per day.    General Instructions    Keep all follow-up visits as told by your health care provider. This is important.  Record this information that may be helpful for you and your health care provider:  How often you have ankle pain.  Where the pain is located.  What the pain feels like.  Take over-the-counter and prescription medicines only as told by your health care provider.    SEEK MEDICAL CARE IF:  Your pain gets worse.  Your pain is not relieved with medicines.  You have a fever or chills.  You are having more trouble with walking.  You have new symptoms.    SEEK IMMEDIATE MEDICAL CARE IF:  Your foot, leg, toes, or ankle tingles or becomes numb.  Your foot, leg, toes, or ankle becomes swollen.  Your foot, leg, toes, or ankle turns pale or blue.    ADDITIONAL NOTES AND INSTRUCTIONS    Please follow up with your Primary MD in 24-48 hr.  Seek immediate medical care for any new/worsening signs or symptoms.

## 2024-04-18 NOTE — ED PROVIDER NOTE - OBJECTIVE STATEMENT
68 year old female, past medical history htn, who presents with l ankle swelling. patient noticed swelling to medial aspect of L ankle extending to distal leg yesterday. no falls/trauma or recent travel. denies f/c, ankle/knee/hip pain or skin changes.

## 2024-04-18 NOTE — ED PROVIDER NOTE - CLINICAL SUMMARY MEDICAL DECISION MAKING FREE TEXT BOX
No distress.  VSS.  Duplex negative.  XRay negative for fracture.  DC home.  F/U with Ortho as scheduled

## 2024-05-01 ENCOUNTER — APPOINTMENT (OUTPATIENT)
Age: 69
End: 2024-05-01

## 2024-05-01 ENCOUNTER — OUTPATIENT (OUTPATIENT)
Dept: OUTPATIENT SERVICES | Facility: HOSPITAL | Age: 69
LOS: 1 days | End: 2024-05-01
Payer: MEDICARE

## 2024-05-01 ENCOUNTER — LABORATORY RESULT (OUTPATIENT)
Age: 69
End: 2024-05-01

## 2024-05-01 DIAGNOSIS — D64.9 ANEMIA, UNSPECIFIED: ICD-10-CM

## 2024-05-01 DIAGNOSIS — Z90.49 ACQUIRED ABSENCE OF OTHER SPECIFIED PARTS OF DIGESTIVE TRACT: Chronic | ICD-10-CM

## 2024-05-01 LAB
ALBUMIN SERPL ELPH-MCNC: 4.2 G/DL
ALP BLD-CCNC: 126 U/L
ALT SERPL-CCNC: 13 U/L
ANION GAP SERPL CALC-SCNC: 10 MMOL/L
AST SERPL-CCNC: 17 U/L
BILIRUB DIRECT SERPL-MCNC: <0.2 MG/DL
BILIRUB INDIRECT SERPL-MCNC: >0.3 MG/DL
BILIRUB SERPL-MCNC: 0.5 MG/DL
BUN SERPL-MCNC: 24 MG/DL
CALCIUM SERPL-MCNC: 9.8 MG/DL
CHLORIDE SERPL-SCNC: 104 MMOL/L
CO2 SERPL-SCNC: 28 MMOL/L
CREAT SERPL-MCNC: 1.3 MG/DL
EGFR: 44 ML/MIN/1.73M2
GLUCOSE SERPL-MCNC: 78 MG/DL
HCT VFR BLD CALC: 44.3 %
HGB BLD-MCNC: 14.8 G/DL
IRON SATN MFR SERPL: 37 %
IRON SERPL-MCNC: 88 UG/DL
MCHC RBC-ENTMCNC: 30.1 PG
MCHC RBC-ENTMCNC: 33.4 G/DL
MCV RBC AUTO: 90.2 FL
PLATELET # BLD AUTO: 201 K/UL
PMV BLD: 10 FL
POTASSIUM SERPL-SCNC: 4.4 MMOL/L
PROT SERPL-MCNC: 7 G/DL
RBC # BLD: 4.91 M/UL
RBC # FLD: 12.7 %
SODIUM SERPL-SCNC: 142 MMOL/L
TIBC SERPL-MCNC: 241 UG/DL
UIBC SERPL-MCNC: 153 UG/DL
WBC # FLD AUTO: 6.24 K/UL

## 2024-05-01 PROCEDURE — 82728 ASSAY OF FERRITIN: CPT

## 2024-05-01 PROCEDURE — 80076 HEPATIC FUNCTION PANEL: CPT

## 2024-05-01 PROCEDURE — 83921 ORGANIC ACID SINGLE QUANT: CPT

## 2024-05-01 PROCEDURE — 83550 IRON BINDING TEST: CPT

## 2024-05-01 PROCEDURE — 83540 ASSAY OF IRON: CPT

## 2024-05-01 PROCEDURE — 36415 COLL VENOUS BLD VENIPUNCTURE: CPT

## 2024-05-01 PROCEDURE — 80048 BASIC METABOLIC PNL TOTAL CA: CPT

## 2024-05-01 PROCEDURE — 85027 COMPLETE CBC AUTOMATED: CPT

## 2024-05-02 DIAGNOSIS — D64.9 ANEMIA, UNSPECIFIED: ICD-10-CM

## 2024-05-02 LAB — FERRITIN SERPL-MCNC: 433 NG/ML

## 2024-05-07 LAB — METHYLMALONATE SERPL-SCNC: 270 NMOL/L

## 2024-05-22 ENCOUNTER — OUTPATIENT (OUTPATIENT)
Dept: OUTPATIENT SERVICES | Facility: HOSPITAL | Age: 69
LOS: 1 days | End: 2024-05-22
Payer: MEDICARE

## 2024-05-22 ENCOUNTER — APPOINTMENT (OUTPATIENT)
Age: 69
End: 2024-05-22
Payer: MEDICARE

## 2024-05-22 VITALS
SYSTOLIC BLOOD PRESSURE: 166 MMHG | RESPIRATION RATE: 16 BRPM | BODY MASS INDEX: 30.36 KG/M2 | WEIGHT: 165 LBS | HEIGHT: 62 IN | DIASTOLIC BLOOD PRESSURE: 84 MMHG | HEART RATE: 64 BPM | OXYGEN SATURATION: 100 %

## 2024-05-22 DIAGNOSIS — Z86.39 PERSONAL HISTORY OF OTHER ENDOCRINE, NUTRITIONAL AND METABOLIC DISEASE: ICD-10-CM

## 2024-05-22 DIAGNOSIS — Z90.49 ACQUIRED ABSENCE OF OTHER SPECIFIED PARTS OF DIGESTIVE TRACT: Chronic | ICD-10-CM

## 2024-05-22 DIAGNOSIS — E53.8 DEFICIENCY OF OTHER SPECIFIED B GROUP VITAMINS: ICD-10-CM

## 2024-05-22 DIAGNOSIS — D64.9 ANEMIA, UNSPECIFIED: ICD-10-CM

## 2024-05-22 DIAGNOSIS — R79.89 OTHER SPECIFIED ABNORMAL FINDINGS OF BLOOD CHEMISTRY: ICD-10-CM

## 2024-05-22 LAB
ANION GAP SERPL CALC-SCNC: 10 MMOL/L
BUN SERPL-MCNC: 17 MG/DL
CALCIUM SERPL-MCNC: 9.5 MG/DL
CHLORIDE SERPL-SCNC: 100 MMOL/L
CO2 SERPL-SCNC: 26 MMOL/L
CREAT SERPL-MCNC: 1.1 MG/DL
EGFR: 54 ML/MIN/1.73M2
GLUCOSE SERPL-MCNC: 92 MG/DL
POTASSIUM SERPL-SCNC: 4.5 MMOL/L
SODIUM SERPL-SCNC: 136 MMOL/L

## 2024-05-22 PROCEDURE — 80048 BASIC METABOLIC PNL TOTAL CA: CPT

## 2024-05-22 PROCEDURE — 99214 OFFICE O/P EST MOD 30 MIN: CPT

## 2024-05-22 RX ORDER — FOLIC ACID 1 MG/1
1 TABLET ORAL DAILY
Qty: 90 | Refills: 3 | Status: ACTIVE | COMMUNITY
Start: 2023-06-02 | End: 1900-01-01

## 2024-05-22 NOTE — ASSESSMENT
[FreeTextEntry1] : # Anemia, normocytic ; vitamin B12 deficiency + iron deficiency - s/p Colonoscopy done in 01/2023 with Dr. Meadows which was reportedly normal ; requested patient to obtain results for our review - s/p Upper Endoscopy done in 05/2023 with Dr. Meadows which was reportedly normal ; requested patient to obtain results for our review - followup with GI as recommended to discuss role of VCE  - followup with GYN as scheduled this month for complete pelvic examination  - s/p IV Venofer 200mg x 5 , completed in 07/2023 - CT C/A/P with IVC 07/2023 shows small bilateral pleural effusion and small pericardial effusion - no additional IV iron needed at this time, hgb up to 14g/dL  - c/w Vitamin B12 1000mcg by mouth daily , Rx renewed  - c/w Folic Acid 1mg by mouth daily , Rx renewed  - Labwork today: BMP to trend   # Elevated ferritin, possibly reactive?  - will continue to trend  - if persistently elevated will consider US Abd for further workup   Encouraged to keep up to date with age appropriate screenings including mammography and GYN pelvic exam.   RTC 3-4 months with Dr. Madison with CBC, BMP, LFTs, iron studies, ferritin, MMA level 5 days prior

## 2024-05-22 NOTE — CONSULT LETTER
[Dear  ___] : Dear  [unfilled], [Consult Letter:] : I had the pleasure of evaluating your patient, [unfilled]. [Please see my note below.] : Please see my note below. [Sincerely,] : Sincerely, [FreeTextEntry3] : Desean Madison DO\par  Attending Physician,\par  Hematology/ Medical Oncology\par  [phone] office\par  \par

## 2024-05-22 NOTE — HISTORY OF PRESENT ILLNESS
[de-identified] : Ms. AMELIA JOHNSON is a 67 year old female here today for evaluation and management of anemia.    AMELIA is a 67 year old F with PMHx including Polymyalgia rheumatica, pleural effusion, HTN, hyperlipidemia, who presents to clinic to establish care.   She states she has not had anemia prior to this occasion.  She was taking MTX and prednisone for PMR but stopped a few days ago.  She follows with [facility], DR. Claros.  She reports SOB with exertion.  Last GYN exam was in June 2022.  She reports unintentional weight loss over the last year as well as occasional night sweats.  Patient denies CP, palpitations, dizziness, dyspnea at rest, hematuria or overt bleeding.  Patient reports no known family history of malignancy or blood/clotting disorder.  Former smoker, quit in 1998.    LAB WORKUP (5.15.2023 - [facility]) WBC 4.7, Hgb 8.7, MCV 86.7, RDW 13.9,  (5.10.2023 - [facility]) WBC 4.1, Hgb 8.6, MCV 87.7, RDW 13.9,  (4.26.2023 - [facility]) WBC 6.9, Hgb 8.5, MCV 86.4, RDW 14,  (4.22.2023 - [facility]) WBC 5.5, Hgb 8.8, MCV 88, RDW 14, , ironsat 11%, TIBC 228, ferritin 221, Vitamin B12 is 230, Folate >24, Cr 1.35, eGFR 43, normal LFTs  HCM Colonoscopy done in 01/2023 with Dr. Meadows which was reportedly normal Upper Endoscopy done in 05/2023 with Dr. Meadows which was reportedly normal  GYN Pelvic Exam/pap overdue  [de-identified] : 7/18/23 Patient is here for a follow-up visit for anemia.  She is feeling well but endorses fatigue.  She completed IV venofer in 07/2023.  Last CBC showed hgb down to 7.7g/dL so she was sent to ER and received 1 unit PRBC.  Patient states she developed RUQ discomfort last week and went to [facility] where they performed CT imaging and further workup including urine testing which showed blood.  She is now on oral abx (Cefuroxime) for suspected UTI.  Patient continues to take oral Vitamin B12 and folic acid once daily.  Reviewed most recent CBC, which shows hgb up to 10.1g/dL.  Patient denies CP, palpitations, dizziness, dyspnea, hematuria or PRBRB.    8/22/23 Patient is here for a follow-up visit for anemia.  She is feeling well compared to last month.  Since last visit, patient went to the hospital with complaint of fatigue, dyspnea and pain with inspiration.  Reviewed most recent CBC which shows hgb up to 10.9g/dL; ferritin 1481, ironsat 39%, TIBC 224.  Patient continues to take oral Vitamin B12 and folic acid once daily.  Patient denies CP, palpitations, dizziness, dyspnea, hematuria or PRBRB.  Reviewed most recent CT imaging from hospital which shows small bilateral pleural effusion and small pericardial effusion.  She was started on prednisone 60mg for 1 week due to suspicion that these findings were due to autoimmune etiology.  She recently followed with [facility] who supplied short term supply of prednisone 5mg due to concern regarding adrenal insufficiency.   CTa Chest (7.24.2023) IMPRESSION:1.  No central or segmental pulmonary embolus.2.  Bilateral pleural effusions with adjacent atelectasis.3.  Small pericardial effusion. CT A/P (7.25.2023)IMPRESSION:1. No evidence of abdominopelvic neoplastic disease.2. Small pericardial effusion. Small bilateral pleural effusions.  12/19/23 Patient is here for a follow-up visit for multifactorial anemia.  Reviewed most recent CBC which shows hgb up to 14g/dL; ferritin 438, ironsat 35%, TIBC 231.  Patient continues to take oral Vitamin B12 and folic acid once daily.  Patient denies CP, palpitations, dizziness, dyspnea, hematuria, black stool or PRBRB.  Weight is improving, appetite is good now.  She had left breast biopsy performed last week at [facility] and is awaiting results.    5/22/24 Patient is here for a follow-up visit for multifactorial anemia.  Reviewed most recent CBC which shows hgb up to 14.8g/dL; ferritin 433, ironsat 37%, TIBC 241.  She does NOT take oral iron.  Patient continues to take oral Vitamin B12 and folic acid once daily.  MMA is normal.  Renal dysfunction is slightly worse.  Patient denies CP, palpitations, dizziness, dyspnea, hematuria, black stool or PRBRB.  She is due for mammogram + GYN exam in June 2024.

## 2024-05-22 NOTE — REASON FOR VISIT
Patient was informed of provider's comments and recommendations via E-advise. Patient was advised to reach out to our office if they have any further questions or concerns.     Lab orders entered.    [Follow-Up Visit] : a follow-up visit for [Blood Count Assessment] : blood count assessment

## 2024-09-13 ENCOUNTER — OUTPATIENT (OUTPATIENT)
Dept: OUTPATIENT SERVICES | Facility: HOSPITAL | Age: 69
LOS: 1 days | End: 2024-09-13
Payer: MEDICARE

## 2024-09-13 ENCOUNTER — APPOINTMENT (OUTPATIENT)
Age: 69
End: 2024-09-13

## 2024-09-13 ENCOUNTER — LABORATORY RESULT (OUTPATIENT)
Age: 69
End: 2024-09-13

## 2024-09-13 DIAGNOSIS — Z90.49 ACQUIRED ABSENCE OF OTHER SPECIFIED PARTS OF DIGESTIVE TRACT: Chronic | ICD-10-CM

## 2024-09-13 DIAGNOSIS — D64.9 ANEMIA, UNSPECIFIED: ICD-10-CM

## 2024-09-13 PROCEDURE — 85027 COMPLETE CBC AUTOMATED: CPT

## 2024-09-13 PROCEDURE — 80048 BASIC METABOLIC PNL TOTAL CA: CPT

## 2024-09-13 PROCEDURE — 83540 ASSAY OF IRON: CPT

## 2024-09-13 PROCEDURE — 83921 ORGANIC ACID SINGLE QUANT: CPT

## 2024-09-13 PROCEDURE — 83550 IRON BINDING TEST: CPT

## 2024-09-13 PROCEDURE — 36415 COLL VENOUS BLD VENIPUNCTURE: CPT

## 2024-09-13 PROCEDURE — 80076 HEPATIC FUNCTION PANEL: CPT

## 2024-09-14 DIAGNOSIS — D64.9 ANEMIA, UNSPECIFIED: ICD-10-CM

## 2024-09-17 LAB
ALBUMIN SERPL ELPH-MCNC: 4.2 G/DL
ALP BLD-CCNC: 108 U/L
ALT SERPL-CCNC: 13 U/L
ANION GAP SERPL CALC-SCNC: 11 MMOL/L
AST SERPL-CCNC: 14 U/L
BILIRUB DIRECT SERPL-MCNC: <0.2 MG/DL
BILIRUB INDIRECT SERPL-MCNC: >0.2 MG/DL
BILIRUB SERPL-MCNC: 0.4 MG/DL
BUN SERPL-MCNC: 23 MG/DL
CALCIUM SERPL-MCNC: 9.3 MG/DL
CHLORIDE SERPL-SCNC: 102 MMOL/L
CO2 SERPL-SCNC: 26 MMOL/L
CREAT SERPL-MCNC: 1.1 MG/DL
EGFR: 54 ML/MIN/1.73M2
GLUCOSE SERPL-MCNC: 84 MG/DL
HCT VFR BLD CALC: 41.4 %
HGB BLD-MCNC: 13.8 G/DL
IRON SATN MFR SERPL: 31 %
IRON SERPL-MCNC: 78 UG/DL
MCHC RBC-ENTMCNC: 29.7 PG
MCHC RBC-ENTMCNC: 33.3 G/DL
MCV RBC AUTO: 89.2 FL
PLATELET # BLD AUTO: 203 K/UL
PMV BLD: 9.9 FL
POTASSIUM SERPL-SCNC: 4.4 MMOL/L
PROT SERPL-MCNC: 6.6 G/DL
RBC # BLD: 4.64 M/UL
RBC # FLD: 13.6 %
SODIUM SERPL-SCNC: 139 MMOL/L
TIBC SERPL-MCNC: 249 UG/DL
UIBC SERPL-MCNC: 171 UG/DL
WBC # FLD AUTO: 7.15 K/UL

## 2024-09-18 ENCOUNTER — OUTPATIENT (OUTPATIENT)
Dept: OUTPATIENT SERVICES | Facility: HOSPITAL | Age: 69
LOS: 1 days | End: 2024-09-18
Payer: MEDICARE

## 2024-09-18 ENCOUNTER — APPOINTMENT (OUTPATIENT)
Age: 69
End: 2024-09-18
Payer: MEDICARE

## 2024-09-18 VITALS
SYSTOLIC BLOOD PRESSURE: 176 MMHG | RESPIRATION RATE: 16 BRPM | HEIGHT: 62 IN | DIASTOLIC BLOOD PRESSURE: 93 MMHG | BODY MASS INDEX: 31.65 KG/M2 | HEART RATE: 89 BPM | OXYGEN SATURATION: 100 % | TEMPERATURE: 98.4 F | WEIGHT: 172 LBS

## 2024-09-18 VITALS — DIASTOLIC BLOOD PRESSURE: 62 MMHG | SYSTOLIC BLOOD PRESSURE: 132 MMHG

## 2024-09-18 DIAGNOSIS — D64.9 ANEMIA, UNSPECIFIED: ICD-10-CM

## 2024-09-18 DIAGNOSIS — R63.4 ABNORMAL WEIGHT LOSS: ICD-10-CM

## 2024-09-18 DIAGNOSIS — Z90.49 ACQUIRED ABSENCE OF OTHER SPECIFIED PARTS OF DIGESTIVE TRACT: Chronic | ICD-10-CM

## 2024-09-18 LAB — METHYLMALONATE SERPL-SCNC: 218 NMOL/L

## 2024-09-18 PROCEDURE — 99214 OFFICE O/P EST MOD 30 MIN: CPT

## 2024-09-18 NOTE — CONSULT LETTER
[Dear  ___] : Dear  [unfilled], [Consult Letter:] : I had the pleasure of evaluating your patient, [unfilled]. [Please see my note below.] : Please see my note below. [Sincerely,] : Sincerely, [FreeTextEntry3] : Desean Madison DO\par  Attending Physician,\par  Hematology/ Medical Oncology\par  [phone] office\par  \par

## 2024-09-18 NOTE — ASSESSMENT
[FreeTextEntry1] : # Anemia, normocytic ; vitamin B12 deficiency + iron deficiency - s/p Colonoscopy done in 01/2023 with Dr. Meadows which was reportedly normal ; requested patient to obtain results for our review - s/p Upper Endoscopy done in 05/2023 with Dr. Meadows which was reportedly normal ; requested patient to obtain results for our review - followup with GI as recommended to discuss role of VCE  - followup with GYN as scheduled this month for complete pelvic examination  - s/p IV Venofer 200mg x 5 , completed in 07/2023 - CT C/A/P with IVC 07/2023 shows small bilateral pleural effusion and small pericardial effusion - no additional IV iron needed at this time, hgb up to 14g/dL  - c/w Vitamin B12 1000mcg by mouth daily , Rx renewed  - c/w Folic Acid 1mg by mouth daily , Rx renewed  - Labwork today: BMP to trend   # Elevated ferritin, possibly reactive?  - will continue to trend  - if persistently elevated will consider US Abd for further workup   Encouraged to keep up to date with age appropriate screenings including mammography and GYN pelvic exam.   discussed w/ pt: she has normal labs and weight recovered: in my opionion , she can be safely discharged from hematology services and f/u PRN

## 2024-09-18 NOTE — HISTORY OF PRESENT ILLNESS
[de-identified] : Ms. AMELIA JOHNSON is a 67 year old female here today for evaluation and management of anemia.    AMELIA is a 67 year old F with PMHx including Polymyalgia rheumatica, pleural effusion, HTN, hyperlipidemia, who presents to clinic to establish care.   She states she has not had anemia prior to this occasion.  She was taking MTX and prednisone for PMR but stopped a few days ago.  She follows with [facility], DR. Claros.  She reports SOB with exertion.  Last GYN exam was in June 2022.  She reports unintentional weight loss over the last year as well as occasional night sweats.  Patient denies CP, palpitations, dizziness, dyspnea at rest, hematuria or overt bleeding.  Patient reports no known family history of malignancy or blood/clotting disorder.  Former smoker, quit in 1998.    LAB WORKUP (5.15.2023 - [facility]) WBC 4.7, Hgb 8.7, MCV 86.7, RDW 13.9,  (5.10.2023 - [facility]) WBC 4.1, Hgb 8.6, MCV 87.7, RDW 13.9,  (4.26.2023 - [facility]) WBC 6.9, Hgb 8.5, MCV 86.4, RDW 14,  (4.22.2023 - [facility]) WBC 5.5, Hgb 8.8, MCV 88, RDW 14, , ironsat 11%, TIBC 228, ferritin 221, Vitamin B12 is 230, Folate >24, Cr 1.35, eGFR 43, normal LFTs  HCM Colonoscopy done in 01/2023 with Dr. Meadows which was reportedly normal Upper Endoscopy done in 05/2023 with Dr. Meadows which was reportedly normal  GYN Pelvic Exam/pap overdue  [de-identified] : 7/18/23 Patient is here for a follow-up visit for anemia.  She is feeling well but endorses fatigue.  She completed IV venofer in 07/2023.  Last CBC showed hgb down to 7.7g/dL so she was sent to ER and received 1 unit PRBC.  Patient states she developed RUQ discomfort last week and went to [facility] where they performed CT imaging and further workup including urine testing which showed blood.  She is now on oral abx (Cefuroxime) for suspected UTI.  Patient continues to take oral Vitamin B12 and folic acid once daily.  Reviewed most recent CBC, which shows hgb up to 10.1g/dL.  Patient denies CP, palpitations, dizziness, dyspnea, hematuria or PRBRB.    8/22/23 Patient is here for a follow-up visit for anemia.  She is feeling well compared to last month.  Since last visit, patient went to the hospital with complaint of fatigue, dyspnea and pain with inspiration.  Reviewed most recent CBC which shows hgb up to 10.9g/dL; ferritin 1481, ironsat 39%, TIBC 224.  Patient continues to take oral Vitamin B12 and folic acid once daily.  Patient denies CP, palpitations, dizziness, dyspnea, hematuria or PRBRB.  Reviewed most recent CT imaging from hospital which shows small bilateral pleural effusion and small pericardial effusion.  She was started on prednisone 60mg for 1 week due to suspicion that these findings were due to autoimmune etiology.  She recently followed with [facility] who supplied short term supply of prednisone 5mg due to concern regarding adrenal insufficiency.   CTa Chest (7.24.2023) IMPRESSION:1.  No central or segmental pulmonary embolus.2.  Bilateral pleural effusions with adjacent atelectasis.3.  Small pericardial effusion. CT A/P (7.25.2023)IMPRESSION:1. No evidence of abdominopelvic neoplastic disease.2. Small pericardial effusion. Small bilateral pleural effusions.  12/19/23 Patient is here for a follow-up visit for multifactorial anemia.  Reviewed most recent CBC which shows hgb up to 14g/dL; ferritin 438, ironsat 35%, TIBC 231.  Patient continues to take oral Vitamin B12 and folic acid once daily.  Patient denies CP, palpitations, dizziness, dyspnea, hematuria, black stool or PRBRB.  Weight is improving, appetite is good now.  She had left breast biopsy performed last week at [facility] and is awaiting results.    5/22/24 Patient is here for a follow-up visit for multifactorial anemia.  Reviewed most recent CBC which shows hgb up to 14.8g/dL; ferritin 433, ironsat 37%, TIBC 241.  She does NOT take oral iron.  Patient continues to take oral Vitamin B12 and folic acid once daily.  MMA is normal.  Renal dysfunction is slightly worse.  Patient denies CP, palpitations, dizziness, dyspnea, hematuria, black stool or PRBRB.  She is due for mammogram + GYN exam in June 2024.   9/18/24

## 2024-09-23 ENCOUNTER — NON-APPOINTMENT (OUTPATIENT)
Age: 69
End: 2024-09-23

## 2025-02-13 ENCOUNTER — APPOINTMENT (OUTPATIENT)
Dept: RADIATION ONCOLOGY | Facility: HOSPITAL | Age: 70
End: 2025-02-13
Payer: MEDICARE

## 2025-02-13 ENCOUNTER — OUTPATIENT (OUTPATIENT)
Dept: OUTPATIENT SERVICES | Facility: HOSPITAL | Age: 70
LOS: 1 days | End: 2025-02-13
Payer: MEDICARE

## 2025-02-13 VITALS
HEART RATE: 80 BPM | WEIGHT: 180 LBS | OXYGEN SATURATION: 100 % | RESPIRATION RATE: 16 BRPM | HEIGHT: 62 IN | SYSTOLIC BLOOD PRESSURE: 166 MMHG | BODY MASS INDEX: 33.13 KG/M2 | DIASTOLIC BLOOD PRESSURE: 78 MMHG | TEMPERATURE: 98.2 F

## 2025-02-13 DIAGNOSIS — Z00.8 ENCOUNTER FOR OTHER GENERAL EXAMINATION: ICD-10-CM

## 2025-02-13 DIAGNOSIS — Z86.018 PERSONAL HISTORY OF OTHER BENIGN NEOPLASM: ICD-10-CM

## 2025-02-13 DIAGNOSIS — Z78.9 OTHER SPECIFIED HEALTH STATUS: ICD-10-CM

## 2025-02-13 DIAGNOSIS — C50.512 MALIGNANT NEOPLASM OF LOWER-OUTER QUADRANT OF LEFT FEMALE BREAST: ICD-10-CM

## 2025-02-13 DIAGNOSIS — Z87.891 PERSONAL HISTORY OF NICOTINE DEPENDENCE: ICD-10-CM

## 2025-02-13 DIAGNOSIS — Z90.49 ACQUIRED ABSENCE OF OTHER SPECIFIED PARTS OF DIGESTIVE TRACT: Chronic | ICD-10-CM

## 2025-02-13 DIAGNOSIS — Z17.0 MALIGNANT NEOPLASM OF LOWER-OUTER QUADRANT OF LEFT FEMALE BREAST: ICD-10-CM

## 2025-02-13 PROCEDURE — 99205 OFFICE O/P NEW HI 60 MIN: CPT

## 2025-02-13 RX ORDER — CHLORTHALIDONE 25 MG/1
25 TABLET ORAL
Refills: 0 | Status: ACTIVE | COMMUNITY

## 2025-02-13 RX ORDER — AMLODIPINE BESYLATE 2.5 MG/1
2.5 TABLET ORAL
Refills: 0 | Status: ACTIVE | COMMUNITY

## 2025-02-13 RX ORDER — ROSUVASTATIN CALCIUM 20 MG/1
20 TABLET, FILM COATED ORAL
Refills: 0 | Status: ACTIVE | COMMUNITY

## 2025-02-14 ENCOUNTER — OUTPATIENT (OUTPATIENT)
Dept: OUTPATIENT SERVICES | Facility: HOSPITAL | Age: 70
LOS: 1 days | End: 2025-02-14
Payer: MEDICARE

## 2025-02-14 DIAGNOSIS — Z00.8 ENCOUNTER FOR OTHER GENERAL EXAMINATION: ICD-10-CM

## 2025-02-14 DIAGNOSIS — Z90.49 ACQUIRED ABSENCE OF OTHER SPECIFIED PARTS OF DIGESTIVE TRACT: Chronic | ICD-10-CM

## 2025-02-14 PROCEDURE — 77263 THER RADIOLOGY TX PLNG CPLX: CPT

## 2025-02-14 PROCEDURE — 77333 RADIATION TREATMENT AID(S): CPT

## 2025-02-14 PROCEDURE — 77333 RADIATION TREATMENT AID(S): CPT | Mod: 26

## 2025-02-14 PROCEDURE — 77290 THER RAD SIMULAJ FIELD CPLX: CPT

## 2025-02-14 PROCEDURE — 77290 THER RAD SIMULAJ FIELD CPLX: CPT | Mod: 26

## 2025-02-17 ENCOUNTER — NON-APPOINTMENT (OUTPATIENT)
Age: 70
End: 2025-02-17

## 2025-02-21 ENCOUNTER — OUTPATIENT (OUTPATIENT)
Dept: OUTPATIENT SERVICES | Facility: HOSPITAL | Age: 70
LOS: 1 days | End: 2025-02-21

## 2025-02-21 DIAGNOSIS — Z90.49 ACQUIRED ABSENCE OF OTHER SPECIFIED PARTS OF DIGESTIVE TRACT: Chronic | ICD-10-CM

## 2025-02-21 PROCEDURE — 77334 RADIATION TREATMENT AID(S): CPT | Mod: 26

## 2025-02-21 PROCEDURE — 77295 3-D RADIOTHERAPY PLAN: CPT | Mod: 26

## 2025-02-21 PROCEDURE — 77300 RADIATION THERAPY DOSE PLAN: CPT | Mod: 26

## 2025-02-24 DIAGNOSIS — Z00.8 ENCOUNTER FOR OTHER GENERAL EXAMINATION: ICD-10-CM

## 2025-03-03 ENCOUNTER — NON-APPOINTMENT (OUTPATIENT)
Age: 70
End: 2025-03-03

## 2025-03-03 ENCOUNTER — OUTPATIENT (OUTPATIENT)
Dept: OUTPATIENT SERVICES | Facility: HOSPITAL | Age: 70
LOS: 1 days | End: 2025-03-03
Payer: MEDICARE

## 2025-03-03 VITALS
BODY MASS INDEX: 32.6 KG/M2 | RESPIRATION RATE: 16 BRPM | DIASTOLIC BLOOD PRESSURE: 83 MMHG | WEIGHT: 178.25 LBS | TEMPERATURE: 98.2 F | SYSTOLIC BLOOD PRESSURE: 148 MMHG | OXYGEN SATURATION: 98 % | HEART RATE: 66 BPM

## 2025-03-03 DIAGNOSIS — Z00.8 ENCOUNTER FOR OTHER GENERAL EXAMINATION: ICD-10-CM

## 2025-03-03 DIAGNOSIS — Z90.49 ACQUIRED ABSENCE OF OTHER SPECIFIED PARTS OF DIGESTIVE TRACT: Chronic | ICD-10-CM

## 2025-03-03 PROCEDURE — 77427 RADIATION TX MANAGEMENT X5: CPT

## 2025-03-03 PROCEDURE — 77333 RADIATION TREATMENT AID(S): CPT | Mod: 26

## 2025-03-03 PROCEDURE — 77387 GUIDANCE FOR RADJ TX DLVR: CPT

## 2025-03-03 PROCEDURE — 77280 THER RAD SIMULAJ FIELD SMPL: CPT | Mod: 26

## 2025-03-03 PROCEDURE — 77333 RADIATION TREATMENT AID(S): CPT

## 2025-03-03 PROCEDURE — 77387C: CUSTOM

## 2025-03-03 PROCEDURE — 77412 RADIATION TX DELIVERY LVL 3: CPT

## 2025-03-03 PROCEDURE — 77280 THER RAD SIMULAJ FIELD SMPL: CPT

## 2025-03-04 ENCOUNTER — OUTPATIENT (OUTPATIENT)
Dept: OUTPATIENT SERVICES | Facility: HOSPITAL | Age: 70
LOS: 1 days | End: 2025-03-04

## 2025-03-04 DIAGNOSIS — Z90.49 ACQUIRED ABSENCE OF OTHER SPECIFIED PARTS OF DIGESTIVE TRACT: Chronic | ICD-10-CM

## 2025-03-05 ENCOUNTER — OUTPATIENT (OUTPATIENT)
Dept: OUTPATIENT SERVICES | Facility: HOSPITAL | Age: 70
LOS: 1 days | End: 2025-03-05

## 2025-03-05 DIAGNOSIS — Z90.49 ACQUIRED ABSENCE OF OTHER SPECIFIED PARTS OF DIGESTIVE TRACT: Chronic | ICD-10-CM

## 2025-03-05 DIAGNOSIS — Z00.8 ENCOUNTER FOR OTHER GENERAL EXAMINATION: ICD-10-CM

## 2025-03-06 ENCOUNTER — OUTPATIENT (OUTPATIENT)
Dept: OUTPATIENT SERVICES | Facility: HOSPITAL | Age: 70
LOS: 1 days | End: 2025-03-06

## 2025-03-06 DIAGNOSIS — Z90.49 ACQUIRED ABSENCE OF OTHER SPECIFIED PARTS OF DIGESTIVE TRACT: Chronic | ICD-10-CM

## 2025-03-06 DIAGNOSIS — Z00.8 ENCOUNTER FOR OTHER GENERAL EXAMINATION: ICD-10-CM

## 2025-03-07 ENCOUNTER — OUTPATIENT (OUTPATIENT)
Dept: OUTPATIENT SERVICES | Facility: HOSPITAL | Age: 70
LOS: 1 days | End: 2025-03-07

## 2025-03-07 DIAGNOSIS — Z90.49 ACQUIRED ABSENCE OF OTHER SPECIFIED PARTS OF DIGESTIVE TRACT: Chronic | ICD-10-CM

## 2025-03-07 DIAGNOSIS — Z00.8 ENCOUNTER FOR OTHER GENERAL EXAMINATION: ICD-10-CM

## 2025-03-07 PROCEDURE — 77387C: CUSTOM

## 2025-03-08 DIAGNOSIS — Z00.8 ENCOUNTER FOR OTHER GENERAL EXAMINATION: ICD-10-CM

## 2025-04-08 ENCOUNTER — APPOINTMENT (OUTPATIENT)
Dept: RADIATION ONCOLOGY | Facility: HOSPITAL | Age: 70
End: 2025-04-08
Payer: MEDICARE

## 2025-04-08 ENCOUNTER — OUTPATIENT (OUTPATIENT)
Dept: OUTPATIENT SERVICES | Facility: HOSPITAL | Age: 70
LOS: 1 days | End: 2025-04-08
Payer: MEDICARE

## 2025-04-08 VITALS
SYSTOLIC BLOOD PRESSURE: 146 MMHG | RESPIRATION RATE: 16 BRPM | OXYGEN SATURATION: 100 % | TEMPERATURE: 98.1 F | DIASTOLIC BLOOD PRESSURE: 78 MMHG | HEART RATE: 52 BPM | BODY MASS INDEX: 32.79 KG/M2 | WEIGHT: 179.25 LBS

## 2025-04-08 DIAGNOSIS — C50.512 MALIGNANT NEOPLASM OF LOWER-OUTER QUADRANT OF LEFT FEMALE BREAST: ICD-10-CM

## 2025-04-08 DIAGNOSIS — Z90.49 ACQUIRED ABSENCE OF OTHER SPECIFIED PARTS OF DIGESTIVE TRACT: Chronic | ICD-10-CM

## 2025-04-08 DIAGNOSIS — Z92.3 PERSONAL HISTORY OF IRRADIATION: ICD-10-CM

## 2025-04-08 DIAGNOSIS — B36.9 SUPERFICIAL MYCOSIS, UNSPECIFIED: ICD-10-CM

## 2025-04-08 DIAGNOSIS — Z17.0 MALIGNANT NEOPLASM OF LOWER-OUTER QUADRANT OF LEFT FEMALE BREAST: ICD-10-CM

## 2025-04-08 PROCEDURE — 99024 POSTOP FOLLOW-UP VISIT: CPT

## 2025-04-08 RX ORDER — ANASTROZOLE TABLETS 1 MG/1
1 TABLET ORAL
Refills: 0 | Status: ACTIVE | COMMUNITY

## 2025-04-11 ENCOUNTER — NON-APPOINTMENT (OUTPATIENT)
Age: 70
End: 2025-04-11

## 2025-04-23 DIAGNOSIS — Z00.8 ENCOUNTER FOR OTHER GENERAL EXAMINATION: ICD-10-CM

## 2025-05-16 DIAGNOSIS — Z17.0 ESTROGEN RECEPTOR POSITIVE STATUS [ER+]: ICD-10-CM

## 2025-05-16 DIAGNOSIS — C50.512 MALIGNANT NEOPLASM OF LOWER-OUTER QUADRANT OF LEFT FEMALE BREAST: ICD-10-CM
